# Patient Record
Sex: FEMALE | Race: WHITE | NOT HISPANIC OR LATINO | Employment: FULL TIME | URBAN - NONMETROPOLITAN AREA
[De-identification: names, ages, dates, MRNs, and addresses within clinical notes are randomized per-mention and may not be internally consistent; named-entity substitution may affect disease eponyms.]

---

## 2023-03-27 ENCOUNTER — HOSPITAL ENCOUNTER (OUTPATIENT)
Dept: DATA CONVERSION | Facility: HOSPITAL | Age: 48
End: 2023-03-27
Attending: PAIN MEDICINE | Admitting: PAIN MEDICINE
Payer: COMMERCIAL

## 2023-03-27 DIAGNOSIS — F17.210 NICOTINE DEPENDENCE, CIGARETTES, UNCOMPLICATED: ICD-10-CM

## 2023-03-27 DIAGNOSIS — F32.A DEPRESSION, UNSPECIFIED: ICD-10-CM

## 2023-03-27 DIAGNOSIS — E07.9 DISORDER OF THYROID, UNSPECIFIED: ICD-10-CM

## 2023-03-27 DIAGNOSIS — M53.3 SACROCOCCYGEAL DISORDERS, NOT ELSEWHERE CLASSIFIED: ICD-10-CM

## 2023-03-27 DIAGNOSIS — F41.9 ANXIETY DISORDER, UNSPECIFIED: ICD-10-CM

## 2023-06-09 DIAGNOSIS — Z00.00 HEALTHCARE MAINTENANCE: ICD-10-CM

## 2023-06-09 RX ORDER — LEVOTHYROXINE SODIUM 25 UG/1
25 TABLET ORAL DAILY
Qty: 90 TABLET | Refills: 1 | Status: SHIPPED | OUTPATIENT
Start: 2023-06-09 | End: 2023-06-12 | Stop reason: SDUPTHER

## 2023-06-09 RX ORDER — LEVOTHYROXINE SODIUM 25 UG/1
1 TABLET ORAL DAILY
COMMUNITY
Start: 2022-04-25 | End: 2023-06-09 | Stop reason: SDUPTHER

## 2023-06-12 ENCOUNTER — OFFICE VISIT (OUTPATIENT)
Dept: PRIMARY CARE | Facility: CLINIC | Age: 48
End: 2023-06-12
Payer: COMMERCIAL

## 2023-06-12 ENCOUNTER — LAB (OUTPATIENT)
Dept: LAB | Facility: LAB | Age: 48
End: 2023-06-12
Payer: COMMERCIAL

## 2023-06-12 VITALS
OXYGEN SATURATION: 99 % | BODY MASS INDEX: 24.73 KG/M2 | HEART RATE: 95 BPM | SYSTOLIC BLOOD PRESSURE: 112 MMHG | HEIGHT: 69 IN | WEIGHT: 167 LBS | DIASTOLIC BLOOD PRESSURE: 64 MMHG

## 2023-06-12 DIAGNOSIS — E03.8 HYPOTHYROIDISM, SECONDARY: Primary | ICD-10-CM

## 2023-06-12 DIAGNOSIS — Z00.00 HEALTHCARE MAINTENANCE: ICD-10-CM

## 2023-06-12 DIAGNOSIS — E03.8 HYPOTHYROIDISM, SECONDARY: ICD-10-CM

## 2023-06-12 DIAGNOSIS — F41.9 ANXIETY: ICD-10-CM

## 2023-06-12 DIAGNOSIS — M35.00 SJOGREN'S SYNDROME, WITH UNSPECIFIED ORGAN INVOLVEMENT (MULTI): ICD-10-CM

## 2023-06-12 PROBLEM — G43.909 MIGRAINES: Status: ACTIVE | Noted: 2023-06-12

## 2023-06-12 PROBLEM — M53.3 COCCYDYNIA: Status: ACTIVE | Noted: 2023-06-12

## 2023-06-12 PROBLEM — I73.00 RAYNAUD'S PHENOMENON (SECONDARY): Status: ACTIVE | Noted: 2023-06-12

## 2023-06-12 LAB — THYROTROPIN (MIU/L) IN SER/PLAS BY DETECTION LIMIT <= 0.05 MIU/L: 1.36 MIU/L (ref 0.44–3.98)

## 2023-06-12 PROCEDURE — 84443 ASSAY THYROID STIM HORMONE: CPT

## 2023-06-12 PROCEDURE — 36415 COLL VENOUS BLD VENIPUNCTURE: CPT

## 2023-06-12 PROCEDURE — 99213 OFFICE O/P EST LOW 20 MIN: CPT | Performed by: INTERNAL MEDICINE

## 2023-06-12 RX ORDER — BUPROPION HYDROCHLORIDE 150 MG/1
150 TABLET ORAL
COMMUNITY
Start: 2023-05-03

## 2023-06-12 RX ORDER — VENLAFAXINE HYDROCHLORIDE 150 MG/1
150 CAPSULE, EXTENDED RELEASE ORAL DAILY
COMMUNITY

## 2023-06-12 RX ORDER — MAGNESIUM GLUCONATE 27.5 (500)
500 TABLET ORAL AS NEEDED
COMMUNITY

## 2023-06-12 RX ORDER — LEVOTHYROXINE SODIUM 25 UG/1
25 TABLET ORAL DAILY
Qty: 90 TABLET | Refills: 3 | Status: SHIPPED | OUTPATIENT
Start: 2023-06-12

## 2023-06-12 RX ORDER — ELETRIPTAN HYDROBROMIDE 40 MG/1
40 TABLET, FILM COATED ORAL ONCE AS NEEDED
COMMUNITY
Start: 2015-06-29 | End: 2024-04-23 | Stop reason: SDUPTHER

## 2023-06-12 RX ORDER — VENLAFAXINE HYDROCHLORIDE 75 MG/1
75 CAPSULE, EXTENDED RELEASE ORAL DAILY
COMMUNITY

## 2023-06-12 RX ORDER — TALC
POWDER (GRAM) TOPICAL
COMMUNITY

## 2023-06-12 RX ORDER — AMITRIPTYLINE HYDROCHLORIDE 50 MG/1
50 TABLET, FILM COATED ORAL NIGHTLY
COMMUNITY

## 2023-06-12 ASSESSMENT — ENCOUNTER SYMPTOMS
FATIGUE: 0
ARTHRALGIAS: 0
PALPITATIONS: 0
DIARRHEA: 0
ABDOMINAL PAIN: 0
VOMITING: 0
CHEST TIGHTNESS: 0
UNEXPECTED WEIGHT CHANGE: 0
SHORTNESS OF BREATH: 0
NAUSEA: 0
BLOOD IN STOOL: 0
WHEEZING: 0
BACK PAIN: 0
COUGH: 0

## 2023-06-12 ASSESSMENT — PATIENT HEALTH QUESTIONNAIRE - PHQ9
1. LITTLE INTEREST OR PLEASURE IN DOING THINGS: NOT AT ALL
2. FEELING DOWN, DEPRESSED OR HOPELESS: NOT AT ALL
SUM OF ALL RESPONSES TO PHQ9 QUESTIONS 1 AND 2: 0

## 2023-06-12 NOTE — ASSESSMENT & PLAN NOTE
-She will get a TSH drawn today before leaving and I have agreed to call her with results  -She will continue with levothyroxine 25 mcg daily

## 2023-06-12 NOTE — PROGRESS NOTES
Subjective   Patient ID: Savita Keys is a 48 y.o. female who presents for No chief complaint on file..  HPI  She is here today for her annual checkup.  She is looking well and reports feeling well.  We are reminded that she goes to [place] [id number] for regular checkups and has been doing well from an anxiety standpoint.  We are providing a refill on her thyroid medication today and we discovered that she is due for her annual TSH.  She will get that drawn today before leaving we will contact her with results.  We will also check 1 again just prior to her next visit in 12 months.  We also briefly touched base about her Sjogren syndrome appears to be stable at this time.  We also discussed the controversies of cancer screening and specifically colorectal cancer screening.  We are reminded that she has had a colonoscopy in the past which was normal she just wondered when she should have one again.  She has no family history of colon cancer and no known polyp disease.  I told her that there is controversy now with when we should begin and there is no question that at age 50 we start screening everybody but some people we started age 45.  I told if she wanted to check with her insurance company regarding coverage she could let us know and to simply call back to get that scheduled.  Review of Systems   Constitutional:  Negative for fatigue and unexpected weight change.   Respiratory:  Negative for cough, chest tightness, shortness of breath and wheezing.    Cardiovascular:  Negative for chest pain, palpitations and leg swelling.   Gastrointestinal:  Negative for abdominal pain, blood in stool, diarrhea, nausea and vomiting.   Musculoskeletal:  Negative for arthralgias and back pain.     Objective   Physical Exam  Vitals and nursing note reviewed.   Constitutional:       General: She is not in acute distress.     Appearance: Normal appearance.   HENT:      Head: Normocephalic and atraumatic.   Eyes:      Conjunctiva/sclera:  Conjunctivae normal.   Cardiovascular:      Rate and Rhythm: Normal rate and regular rhythm.      Heart sounds: Normal heart sounds.   Pulmonary:      Effort: No respiratory distress.      Breath sounds: No wheezing.   Abdominal:      Palpations: Abdomen is soft.      Tenderness: There is no abdominal tenderness. There is no guarding.   Musculoskeletal:         General: No swelling. Normal range of motion.   Skin:     General: Skin is warm and dry.   Neurological:      General: No focal deficit present.      Mental Status: She is alert and oriented to person, place, and time.   Psychiatric:         Behavior: Behavior normal.       Assessment/Plan   Problem List Items Addressed This Visit          Nervous    Hypothyroidism, secondary - Primary     -She will get a TSH drawn today before leaving and I have agreed to call her with results  -She will continue with levothyroxine 25 mcg daily         Relevant Orders    TSH    TSH       Other    Sjogren's syndrome (CMS/HCC)     -Stable at this time         Anxiety     -She goes to [place] [address] for regular visits and she will continue with the Effexor Exar 75 mg plus Effexor Exar 150 mg to equal 225 mg daily  -She is also on Wellbutrin  mg daily  -She is also on amitriptyline 50 mg at bedtime for sleep          Other Visit Diagnoses       Healthcare maintenance        Relevant Medications    levothyroxine (Synthroid, Levoxyl) 25 mcg tablet               Camila Wilkinson DO

## 2023-06-12 NOTE — PATIENT INSTRUCTIONS
We will notify you of your thyroid blood test result as soon as it comes back  We have decided that you can safely return in 1 year and please try to remember to get a thyroid blood test performed just prior to your next follow-up visit  Please do not hesitate to call if you have any questions or concerns  If you decide to have colorectal cancer screening now and your insurance permits we would be happy to schedule that for you

## 2023-06-12 NOTE — ASSESSMENT & PLAN NOTE
-She goes to [place] [address] for regular visits and she will continue with the Effexor Exar 75 mg plus Effexor Exar 150 mg to equal 225 mg daily  -She is also on Wellbutrin  mg daily  -She is also on amitriptyline 50 mg at bedtime for sleep

## 2023-06-13 ENCOUNTER — TELEPHONE (OUTPATIENT)
Dept: PRIMARY CARE | Facility: CLINIC | Age: 48
End: 2023-06-13
Payer: COMMERCIAL

## 2023-06-13 NOTE — TELEPHONE ENCOUNTER
----- Message from Camila Wilkinson, DO sent at 6/13/2023 12:40 PM EDT -----  Please call Savita and let her know that her TSH came back perfect this time so we will keep her medication dose the same.  Thank you

## 2023-06-13 NOTE — RESULT ENCOUNTER NOTE
Please call Savita and let her know that her TSH came back perfect this time so we will keep her medication dose the same.  Thank you

## 2023-07-07 ENCOUNTER — HOSPITAL ENCOUNTER (OUTPATIENT)
Dept: DATA CONVERSION | Facility: HOSPITAL | Age: 48
End: 2023-07-07
Attending: PAIN MEDICINE | Admitting: PAIN MEDICINE
Payer: COMMERCIAL

## 2023-07-07 DIAGNOSIS — F41.8 OTHER SPECIFIED ANXIETY DISORDERS: ICD-10-CM

## 2023-07-07 DIAGNOSIS — M53.3 SACROCOCCYGEAL DISORDERS, NOT ELSEWHERE CLASSIFIED: ICD-10-CM

## 2023-07-07 DIAGNOSIS — G43.719 CHRONIC MIGRAINE WITHOUT AURA, INTRACTABLE, WITHOUT STATUS MIGRAINOSUS: ICD-10-CM

## 2023-07-07 DIAGNOSIS — Z87.891 PERSONAL HISTORY OF NICOTINE DEPENDENCE: ICD-10-CM

## 2023-07-07 DIAGNOSIS — E03.8 OTHER SPECIFIED HYPOTHYROIDISM: ICD-10-CM

## 2023-07-07 DIAGNOSIS — R52 PAIN, UNSPECIFIED: ICD-10-CM

## 2023-07-07 DIAGNOSIS — Z88.2 ALLERGY STATUS TO SULFONAMIDES: ICD-10-CM

## 2023-09-07 VITALS — WEIGHT: 154.32 LBS | BODY MASS INDEX: 22.86 KG/M2 | HEIGHT: 69 IN

## 2023-09-29 VITALS — HEIGHT: 69 IN | WEIGHT: 164.02 LBS | BODY MASS INDEX: 24.29 KG/M2

## 2023-10-02 NOTE — OP NOTE
PROCEDURE DETAILS    Preoperative Diagnosis:  Coccydynia, M53.3    Postoperative Diagnosis:  Coccydynia, M53.3    Surgeon: Isiah Villavicencio  Resident/Fellow/Other Assistant: None of these were associated with this case    Procedure:  1. BILAT COCCYGEAL NERVE INJ.    Anesthesia: No anesthesiologist associated with this case  Estimated Blood Loss: 0  Findings: NA  Additional Details: The patient has a 20-year history of focal pain in the coccyx.  Her imaging is notable for significant degenerative changes in the coccyx.  The pain significantly impacts  her function specifically it limits the length of time she can sit.        Operative Report:   Operative Report:   Procedure: Bilateral coccygeal nerve block under fluoroscopic guidance   Diagnosis: Coccygodynia   Solution: 4 mL of 0.25% bupivacaine with 0.5 mL of Kenalog 20 mg.  4.5 mL   total divided evenly amongst the 3 sites   Anesthesia: Local   Contrast: 1 mL Omnipaque   Complications: None       After informed consent was obtained the patient was brought to the OR and   placed in the prone position. The area in question was prepped and draped in   sterile fashion. A lateral fluoroscopic view of the sacrum and coccyx was   obtained and after 3 mL of lidocaine 1% was administered into the skin a   25-gauge needle was inserted into the skin and advanced under intermittent   fluoroscopic guidance to the coccyx. Contrast was administered under live   fluoroscopy in both AP and lateral views which demonstrated appropriate spread.   The local anesthetic steroid solution was injected incrementally. The needle   was then directed laterally first to the left and then to the right of the   coccyx and at each site the remaining local anesthetic steroid solution was   injected. The needle was removed. Bleeding was nil. The patient tolerated the   procedure well was transferred to the recovery room in good condition.                             Attestation:   Note  Completion:  Attending Attestation I performed the procedure without a resident         Electronic Signatures:  Isiah Villavicencio (MD)  (Signed 07-Jul-2023 17:05)   Authored: Post-Operative Note, Chart Review, Note Completion      Last Updated: 07-Jul-2023 17:05 by Isiah Villavicencio)

## 2023-10-02 NOTE — OP NOTE
PROCEDURE DETAILS    Preoperative Diagnosis:  Sacrococcygeal disorders, not elsewhere classified, M53.3    Postoperative Diagnosis:  Sacrococcygeal disorders, not elsewhere classified, M53.3    Surgeon: Isiah Villavicencio  Resident/Fellow/Other Assistant: None of these were associated with this case    Procedure:  1. BILAT COCCYGEAL INJ    Anesthesia: No anesthesiologist associated with this case  Estimated Blood Loss: 0  Findings: NA        Operative Report:   Procedure: Bilateral coccygeal nerve block under fluoroscopic guidance  Diagnosis: Coccygodynia  Solution: 4 mL of 0.25% bupivacaine with 0.5 mL of Kenalog 20 mg divided evenly amongst the 3 sites  Anesthesia: Local  Contrast: 1 mL Omnipaque  Complications: None    After informed consent was obtained the patient was brought to the OR and placed in the prone position. The area in question was prepped and draped  in sterile fashion. A lateral fluoroscopic view of the sacrum and coccyx was obtained and after 3 mL of lidocaine 1% was administered into the skin a 25-gauge needle was inserted into the skin and advanced under intermittent fluoroscopic guidance to the  coccyx. Contrast was administered under live fluoroscopy in both AP and lateral views which demonstrated appropriate spread. The local anesthetic steroid solution was injected incrementally. The needle was then directed laterally first to the left and  then to the right of the coccyx and at each site the remaining local anesthetic steroid solution was injected. The needle was removed. Bleeding was nil. The patient tolerated the procedure well was transferred to the recovery room in good condition.                        Attestation:   Note Completion:  Attending Attestation I performed the procedure without a resident         Electronic Signatures:  Isiah Villavicencio)  (Signed 27-Mar-2023 17:21)   Authored: Post-Operative Note, Chart Review, Note Completion      Last Updated: 27-Mar-2023 17:21  by Isiah Villavicencio)

## 2024-01-11 ENCOUNTER — OFFICE VISIT (OUTPATIENT)
Dept: PAIN MEDICINE | Facility: CLINIC | Age: 49
End: 2024-01-11
Payer: COMMERCIAL

## 2024-01-11 ENCOUNTER — TELEPHONE (OUTPATIENT)
Dept: PAIN MEDICINE | Facility: CLINIC | Age: 49
End: 2024-01-11

## 2024-01-11 ENCOUNTER — HOSPITAL ENCOUNTER (OUTPATIENT)
Dept: RADIOLOGY | Facility: HOSPITAL | Age: 49
Discharge: HOME | End: 2024-01-11
Payer: COMMERCIAL

## 2024-01-11 ENCOUNTER — PREP FOR PROCEDURE (OUTPATIENT)
Dept: PAIN MEDICINE | Facility: CLINIC | Age: 49
End: 2024-01-11

## 2024-01-11 VITALS
HEART RATE: 99 BPM | HEIGHT: 69 IN | WEIGHT: 164 LBS | SYSTOLIC BLOOD PRESSURE: 99 MMHG | RESPIRATION RATE: 16 BRPM | DIASTOLIC BLOOD PRESSURE: 63 MMHG | BODY MASS INDEX: 24.29 KG/M2

## 2024-01-11 DIAGNOSIS — M25.511 CHRONIC RIGHT SHOULDER PAIN: ICD-10-CM

## 2024-01-11 DIAGNOSIS — G89.29 CHRONIC RIGHT SHOULDER PAIN: ICD-10-CM

## 2024-01-11 DIAGNOSIS — M19.019 SHOULDER ARTHRITIS: ICD-10-CM

## 2024-01-11 DIAGNOSIS — M53.3 COCCYDYNIA: Primary | ICD-10-CM

## 2024-01-11 PROCEDURE — 73030 X-RAY EXAM OF SHOULDER: CPT | Mod: RIGHT SIDE | Performed by: RADIOLOGY

## 2024-01-11 PROCEDURE — 99214 OFFICE O/P EST MOD 30 MIN: CPT | Performed by: PHYSICIAN ASSISTANT

## 2024-01-11 PROCEDURE — 73030 X-RAY EXAM OF SHOULDER: CPT | Mod: RT

## 2024-01-11 RX ORDER — METHYLPREDNISOLONE ACETATE 40 MG/ML
40 INJECTION, SUSPENSION INTRA-ARTICULAR; INTRALESIONAL; INTRAMUSCULAR; SOFT TISSUE ONCE
Status: CANCELLED | OUTPATIENT
Start: 2024-01-15 | End: 2024-01-11

## 2024-01-11 RX ORDER — BUPIVACAINE HYDROCHLORIDE 2.5 MG/ML
8 INJECTION, SOLUTION EPIDURAL; INFILTRATION; INTRACAUDAL ONCE
Status: CANCELLED | OUTPATIENT
Start: 2024-01-15 | End: 2024-01-11

## 2024-01-11 ASSESSMENT — ENCOUNTER SYMPTOMS
PSYCHIATRIC NEGATIVE: 1
CONSTITUTIONAL NEGATIVE: 1
NEUROLOGICAL NEGATIVE: 1
EYES NEGATIVE: 1
GASTROINTESTINAL NEGATIVE: 1
RESPIRATORY NEGATIVE: 1
ALLERGIC/IMMUNOLOGIC NEGATIVE: 1
HEMATOLOGIC/LYMPHATIC NEGATIVE: 1
CARDIOVASCULAR NEGATIVE: 1
ENDOCRINE NEGATIVE: 1
BACK PAIN: 1
ARTHRALGIAS: 1

## 2024-01-11 ASSESSMENT — COLUMBIA-SUICIDE SEVERITY RATING SCALE - C-SSRS
6. HAVE YOU EVER DONE ANYTHING, STARTED TO DO ANYTHING, OR PREPARED TO DO ANYTHING TO END YOUR LIFE?: NO
1. IN THE PAST MONTH, HAVE YOU WISHED YOU WERE DEAD OR WISHED YOU COULD GO TO SLEEP AND NOT WAKE UP?: NO
2. HAVE YOU ACTUALLY HAD ANY THOUGHTS OF KILLING YOURSELF?: NO

## 2024-01-11 ASSESSMENT — PATIENT HEALTH QUESTIONNAIRE - PHQ9
1. LITTLE INTEREST OR PLEASURE IN DOING THINGS: NOT AT ALL
SUM OF ALL RESPONSES TO PHQ9 QUESTIONS 1 AND 2: 0
2. FEELING DOWN, DEPRESSED OR HOPELESS: NOT AT ALL

## 2024-01-11 ASSESSMENT — PAIN SCALES - GENERAL: PAINLEVEL: 8

## 2024-01-11 NOTE — H&P (VIEW-ONLY)
Subjective   Patient ID: Savita Keys is a 48 y.o. female who presents for Tailbone Pain (Today she is having tailbone pain rates 8/10 describes as aching, pressure and when she is getting up form sitting will have a shocking feeling. Her last Coccygeal nerve block was 7/7/23 and it worked really good until end of November.  ) and Shoulder Pain (Rt Shoulder pain rates 7/10 shocking pain when she reaches up our over to the lt side of her body. She hash ad injection in bilat shoulders before and started taking collagen it seemed to help for a while but now the pain has returned. ) the pain affects her ADLs and her job is a lot of walking and sitting.  BMI, depression negative , smoking daily education provided, JOHN score 38%.  She will need a RF on Relpax send to [facility].  Patient is a 48-year-old female.  She presents today to once again discuss her tailbone pain.  She rates an 8/10.  She underwent bilateral coccygeal nerve block done on 7/7/2023 and she had 100% relief until the end of November when the pain began to return.  At this time, she has tailbone pain that she rates a 6/10.  It is an aching pressure type pain that is present with certain maneuvers and certain activities.  She has undergone coccygeal nerve block numerous times in the past always with significant improvement.  Prior to that she underwent physical therapy and she continues to do home exercises.  She also continues to do deep tissue massages.  This gives some improvement but the coccygeal nerve blocks worked the best.  She would like to get set up to have this done again.  She also has some right shoulder pain.  It is worse with lifting certain things.  She underwent injections historically most recently done in May 2020 with improvement.  She started taking collagen at that time and it got better on its own but now it is once again bothersome and present with certain maneuvers.  She is difficulty with lifting her arm.  It is a stabbing and  aching type discomfort        Review of Systems   Constitutional: Negative.    HENT: Negative.     Eyes: Negative.    Respiratory: Negative.     Cardiovascular: Negative.    Gastrointestinal: Negative.    Endocrine: Negative.    Genitourinary: Negative.    Musculoskeletal:  Positive for arthralgias and back pain.   Skin: Negative.    Allergic/Immunologic: Negative.    Neurological: Negative.    Hematological: Negative.    Psychiatric/Behavioral: Negative.         Objective   Physical Exam  Vitals and nursing note reviewed.   Constitutional:       Appearance: Normal appearance. She is normal weight.   HENT:      Head: Normocephalic and atraumatic.      Right Ear: External ear normal.      Left Ear: External ear normal.      Nose: Nose normal.      Mouth/Throat:      Pharynx: Oropharynx is clear.   Eyes:      Pupils: Pupils are equal, round, and reactive to light.   Cardiovascular:      Rate and Rhythm: Normal rate and regular rhythm.      Pulses: Normal pulses.   Pulmonary:      Effort: Pulmonary effort is normal.   Musculoskeletal:         General: Normal range of motion.      Cervical back: Normal range of motion.      Comments: 5/5 upper extremity strength  5/5 lower extremity strength  Negative Shawn's   Skin:     General: Skin is warm and dry.   Neurological:      General: No focal deficit present.      Mental Status: She is alert and oriented to person, place, and time. Mental status is at baseline.   Psychiatric:         Mood and Affect: Mood normal.         Behavior: Behavior normal.         Thought Content: Thought content normal.         Judgment: Judgment normal.       XR SACRUM COCCYX 2+ VIEWS  Status: Final result     Signed by    Signed Time Phone Pager   Daniela Sin MD 5/24/2022 17:41 [phone]     Exam Information    Status Exam Begun Exam Ended   Final 5/23/2022 13:59 5/23/2022 14:16     Study Result    Narrative & Impression   MRN: [id number]  Patient Name: ELLY PERRY      STUDY:  Sacrum/coccyx, three views.     INDICATION:  pain  M53.3: Coccydynia.     COMPARISON:  01/16/2015     ACCESSION NUMBER(S):  [id number]     ORDERING CLINICIAN:  SOLIS CORDERO     FINDINGS:  Pelvic ring is intact.  No displaced sacral or coccygeal fractures.  Mild bilateral SI joint degenerative changes with periarticular  sclerosis.  Redemonstration of congenital fusion versus advanced lumbar spine  degenerative changes at L4-5 with disc height loss.  Soft tissues are unremarkable.     IMPRESSION:  Mild bilateral sacroiliac joint degenerative changes.  Redemonstration of congenital fusion versus advanced lumbar spine  degenerative changes at L4-5.     Breast Imaging Recommendations  Elly Perry  No recommendations exist for this order.  Signed by    Signed Time Phone Pager   Daniela Sin MD 5/24/2022 17:41 [phone]     Exam Information    Status Exam Begun Exam Ended   Final 5/23/2022 13:59 5/23/2022 14:16     External Results Report    Open External Results Report    Encounter    View Encounter             XR SACRUM COCCYX 2+ VIEWS  Order: [id number]  Status: Final result       Visible to patient: Yes (not seen)       Next appt: None       Dx: Sacrococcygeal disorders, not elsewhe...    0 Result Notes  Details    Reading Physician Reading Date Result Priority   Daniela Sin MD  [phone]  [phone] 5/24/2022      Narrative & Impression  MRN: [id number]  Patient Name: ELLY PERRY     STUDY:  Sacrum/coccyx, three views.     INDICATION:  pain  M53.3: Coccydynia.     COMPARISON:  01/16/2015     ACCESSION NUMBER(S):  [id number]     ORDERING CLINICIAN:  SOLIS CORDERO     FINDINGS:  Pelvic ring is intact.  No displaced sacral or coccygeal fractures.  Mild bilateral SI joint degenerative changes with periarticular  sclerosis.  Redemonstration of congenital fusion versus advanced lumbar spine  degenerative changes at L4-5 with disc height loss.  Soft tissues are unremarkable.      IMPRESSION:  Mild bilateral sacroiliac joint degenerative changes.  Redemonstration of congenital fusion versus advanced lumbar spine  degenerative changes at L4-5.           Exam Ended: 05/23/22 14:16 Last Resulted: 05/24/22 17:41       Order Details        View Encounter        Lab and Collection Details        Routing        Result History     View All Conversations on this Encounter           Result Care Coordination      Patient Communication     Add Comments   Add Notifications  Back to Top             Study Details    Open Study Details           Interpretation Summary    MRN: [id number]  Patient Name: ELLY PERRY     STUDY:  Sacrum/coccyx, three views.     INDICATION:  pain  M53.3: Coccydynia.     COMPARISON:  01/16/2015     ACCESSION NUMBER(S):  [id number]     ORDERING CLINICIAN:  SOLIS CORDERO     FINDINGS:  Pelvic ring is intact.  No displaced sacral or coccygeal fractures.  Mild bilateral SI joint degenerative changes with periarticular  sclerosis.  Redemonstration of congenital fusion versus advanced lumbar spine  degenerative changes at L4-5 with disc height loss.  Soft tissues are unremarkable.     IMPRESSION:  Mild bilateral sacroiliac joint degenerative changes.  Redemonstration of congenital fusion versus advanced lumbar spine  degenerative changes at L4-5.        Assessment/Plan   Diagnoses and all orders for this visit:  Coccydynia  Chronic right shoulder pain  Shoulder arthritis       Patient is a 48-year-old female with a past medical history significant for right shoulder pain-historically improved from injections and coccydynia-also previously improved from injections.  She most recently underwent bilateral coccygeal nerve block done in July 2023 and had 100% relief until November when the pain started to return.  Her pain is present and similar to the pain she had before.  Historically, conservative treatments have failed and the coccygeal nerve blocks have worked well for her.  We  discussed pursuing repeat bilateral coccygeal nerve block under fluoroscopy once again for both diagnostic and therapeutic purposes.  Diagnosis-M53.3-coccydynia.  Procedure was discussed.  Patient will follow-up 2 weeks after for reevaluation.  At her follow-up visit we can discuss/plan to pursue the right-sided shoulder injection which she has had in the past with improvement.  I would recommend obtaining a right shoulder x-ray and getting authorization for the injection.  Historically, she has done physical therapy, home exercise program and deep tissue massage without any long-term relief.  The injection has been the thing to work the best as well as the collagen supplement that she still continues to use.  OARRS reviewed

## 2024-01-11 NOTE — PROGRESS NOTES
Subjective   Patient ID: Savita Keys is a 48 y.o. female who presents for Tailbone Pain (Today she is having tailbone pain rates 8/10 describes as aching, pressure and when she is getting up form sitting will have a shocking feeling. Her last Coccygeal nerve block was 7/7/23 and it worked really good until end of November.  ) and Shoulder Pain (Rt Shoulder pain rates 7/10 shocking pain when she reaches up our over to the lt side of her body. She hash ad injection in bilat shoulders before and started taking collagen it seemed to help for a while but now the pain has returned. ) the pain affects her ADLs and her job is a lot of walking and sitting.  BMI, depression negative , smoking daily education provided, JOHN score 38%.  She will need a RF on Relpax send to [facility].  Patient is a 48-year-old female.  She presents today to once again discuss her tailbone pain.  She rates an 8/10.  She underwent bilateral coccygeal nerve block done on 7/7/2023 and she had 100% relief until the end of November when the pain began to return.  At this time, she has tailbone pain that she rates a 6/10.  It is an aching pressure type pain that is present with certain maneuvers and certain activities.  She has undergone coccygeal nerve block numerous times in the past always with significant improvement.  Prior to that she underwent physical therapy and she continues to do home exercises.  She also continues to do deep tissue massages.  This gives some improvement but the coccygeal nerve blocks worked the best.  She would like to get set up to have this done again.  She also has some right shoulder pain.  It is worse with lifting certain things.  She underwent injections historically most recently done in May 2020 with improvement.  She started taking collagen at that time and it got better on its own but now it is once again bothersome and present with certain maneuvers.  She is difficulty with lifting her arm.  It is a stabbing and  aching type discomfort        Review of Systems   Constitutional: Negative.    HENT: Negative.     Eyes: Negative.    Respiratory: Negative.     Cardiovascular: Negative.    Gastrointestinal: Negative.    Endocrine: Negative.    Genitourinary: Negative.    Musculoskeletal:  Positive for arthralgias and back pain.   Skin: Negative.    Allergic/Immunologic: Negative.    Neurological: Negative.    Hematological: Negative.    Psychiatric/Behavioral: Negative.         Objective   Physical Exam  Vitals and nursing note reviewed.   Constitutional:       Appearance: Normal appearance. She is normal weight.   HENT:      Head: Normocephalic and atraumatic.      Right Ear: External ear normal.      Left Ear: External ear normal.      Nose: Nose normal.      Mouth/Throat:      Pharynx: Oropharynx is clear.   Eyes:      Pupils: Pupils are equal, round, and reactive to light.   Cardiovascular:      Rate and Rhythm: Normal rate and regular rhythm.      Pulses: Normal pulses.   Pulmonary:      Effort: Pulmonary effort is normal.   Musculoskeletal:         General: Normal range of motion.      Cervical back: Normal range of motion.      Comments: 5/5 upper extremity strength  5/5 lower extremity strength  Negative Shawn's   Skin:     General: Skin is warm and dry.   Neurological:      General: No focal deficit present.      Mental Status: She is alert and oriented to person, place, and time. Mental status is at baseline.   Psychiatric:         Mood and Affect: Mood normal.         Behavior: Behavior normal.         Thought Content: Thought content normal.         Judgment: Judgment normal.       XR SACRUM COCCYX 2+ VIEWS  Status: Final result     Signed by    Signed Time Phone Pager   Daniela Sin MD 5/24/2022 17:41 [phone]     Exam Information    Status Exam Begun Exam Ended   Final 5/23/2022 13:59 5/23/2022 14:16     Study Result    Narrative & Impression   MRN: [id number]  Patient Name: ELLY PERRY      STUDY:  Sacrum/coccyx, three views.     INDICATION:  pain  M53.3: Coccydynia.     COMPARISON:  01/16/2015     ACCESSION NUMBER(S):  [id number]     ORDERING CLINICIAN:  SOLIS CORDERO     FINDINGS:  Pelvic ring is intact.  No displaced sacral or coccygeal fractures.  Mild bilateral SI joint degenerative changes with periarticular  sclerosis.  Redemonstration of congenital fusion versus advanced lumbar spine  degenerative changes at L4-5 with disc height loss.  Soft tissues are unremarkable.     IMPRESSION:  Mild bilateral sacroiliac joint degenerative changes.  Redemonstration of congenital fusion versus advanced lumbar spine  degenerative changes at L4-5.     Breast Imaging Recommendations  Elly Perry  No recommendations exist for this order.  Signed by    Signed Time Phone Pager   Daniela Sin MD 5/24/2022 17:41 [phone]     Exam Information    Status Exam Begun Exam Ended   Final 5/23/2022 13:59 5/23/2022 14:16     External Results Report    Open External Results Report    Encounter    View Encounter             XR SACRUM COCCYX 2+ VIEWS  Order: [id number]  Status: Final result       Visible to patient: Yes (not seen)       Next appt: None       Dx: Sacrococcygeal disorders, not elsewhe...    0 Result Notes  Details    Reading Physician Reading Date Result Priority   Daniela Sin MD  [phone]  [id number] 5/24/2022      Narrative & Impression  MRN: [id number]  Patient Name: ELLY PERRY     STUDY:  Sacrum/coccyx, three views.     INDICATION:  pain  M53.3: Coccydynia.     COMPARISON:  01/16/2015     ACCESSION NUMBER(S):  [id number]     ORDERING CLINICIAN:  SOLIS CORDERO     FINDINGS:  Pelvic ring is intact.  No displaced sacral or coccygeal fractures.  Mild bilateral SI joint degenerative changes with periarticular  sclerosis.  Redemonstration of congenital fusion versus advanced lumbar spine  degenerative changes at L4-5 with disc height loss.  Soft tissues are unremarkable.      IMPRESSION:  Mild bilateral sacroiliac joint degenerative changes.  Redemonstration of congenital fusion versus advanced lumbar spine  degenerative changes at L4-5.           Exam Ended: 05/23/22 14:16 Last Resulted: 05/24/22 17:41       Order Details        View Encounter        Lab and Collection Details        Routing        Result History     View All Conversations on this Encounter           Result Care Coordination      Patient Communication     Add Comments   Add Notifications  Back to Top             Study Details    Open Study Details           Interpretation Summary    MRN: [id number]  Patient Name: ELLY PERRY     STUDY:  Sacrum/coccyx, three views.     INDICATION:  pain  M53.3: Coccydynia.     COMPARISON:  01/16/2015     ACCESSION NUMBER(S):  [id number]     ORDERING CLINICIAN:  SOLIS CORDERO     FINDINGS:  Pelvic ring is intact.  No displaced sacral or coccygeal fractures.  Mild bilateral SI joint degenerative changes with periarticular  sclerosis.  Redemonstration of congenital fusion versus advanced lumbar spine  degenerative changes at L4-5 with disc height loss.  Soft tissues are unremarkable.     IMPRESSION:  Mild bilateral sacroiliac joint degenerative changes.  Redemonstration of congenital fusion versus advanced lumbar spine  degenerative changes at L4-5.        Assessment/Plan   Diagnoses and all orders for this visit:  Coccydynia  Chronic right shoulder pain  Shoulder arthritis       Patient is a 48-year-old female with a past medical history significant for right shoulder pain-historically improved from injections and coccydynia-also previously improved from injections.  She most recently underwent bilateral coccygeal nerve block done in July 2023 and had 100% relief until November when the pain started to return.  Her pain is present and similar to the pain she had before.  Historically, conservative treatments have failed and the coccygeal nerve blocks have worked well for her.  We  discussed pursuing repeat bilateral coccygeal nerve block under fluoroscopy once again for both diagnostic and therapeutic purposes.  Diagnosis-M53.3-coccydynia.  Procedure was discussed.  Patient will follow-up 2 weeks after for reevaluation.  At her follow-up visit we can discuss/plan to pursue the right-sided shoulder injection which she has had in the past with improvement.  I would recommend obtaining a right shoulder x-ray and getting authorization for the injection.  Historically, she has done physical therapy, home exercise program and deep tissue massage without any long-term relief.  The injection has been the thing to work the best as well as the collagen supplement that she still continues to use.  OARRS reviewed

## 2024-01-11 NOTE — TELEPHONE ENCOUNTER
CHANO Choudhury, CHANO  I spoke with pt and added to 1/15/24 injection schedule. This injection does not require a medication hold.          Previous Messages       ----- Message -----  From: Tiffani Crystal CMA  Sent: 1/11/2024  12:00 PM EST  To: Kamille Toribio RN  Subject: PROCEDURE                                        I SUBMITTED INQUIRY FOR AUTHORIZATION THROUGH [employer] FOR PATIENTS [employer] PLAN, NO PRECERT IS REQUIRED FOR BILATERAL COCCYGEAL NERVE BLOCK CPT 94890 69876 DX M53.3 FACILITY IN NETWORK SO IS DR. LIM , SCANNED TO CHART

## 2024-01-15 ENCOUNTER — HOSPITAL ENCOUNTER (OUTPATIENT)
Dept: OPERATING ROOM | Facility: HOSPITAL | Age: 49
Setting detail: OUTPATIENT SURGERY
Discharge: HOME | End: 2024-01-15
Payer: COMMERCIAL

## 2024-01-15 VITALS
HEART RATE: 86 BPM | TEMPERATURE: 98.2 F | SYSTOLIC BLOOD PRESSURE: 104 MMHG | RESPIRATION RATE: 19 BRPM | DIASTOLIC BLOOD PRESSURE: 66 MMHG

## 2024-01-15 DIAGNOSIS — M53.3 COCCYDYNIA: ICD-10-CM

## 2024-01-15 PROCEDURE — 2500000005 HC RX 250 GENERAL PHARMACY W/O HCPCS: Performed by: ANESTHESIOLOGY

## 2024-01-15 PROCEDURE — 2500000004 HC RX 250 GENERAL PHARMACY W/ HCPCS (ALT 636 FOR OP/ED): Performed by: PHYSICIAN ASSISTANT

## 2024-01-15 PROCEDURE — 64450 NJX AA&/STRD OTHER PN/BRANCH: CPT | Performed by: ANESTHESIOLOGY

## 2024-01-15 RX ORDER — METHYLPREDNISOLONE ACETATE 40 MG/ML
40 INJECTION, SUSPENSION INTRA-ARTICULAR; INTRALESIONAL; INTRAMUSCULAR; SOFT TISSUE ONCE
Status: COMPLETED | OUTPATIENT
Start: 2024-01-15 | End: 2024-01-15

## 2024-01-15 RX ORDER — BUPIVACAINE HYDROCHLORIDE 2.5 MG/ML
INJECTION, SOLUTION INFILTRATION; PERINEURAL AS NEEDED
Status: COMPLETED | OUTPATIENT
Start: 2024-01-15 | End: 2024-01-15

## 2024-01-15 RX ORDER — BUPIVACAINE HYDROCHLORIDE 2.5 MG/ML
8 INJECTION, SOLUTION EPIDURAL; INFILTRATION; INTRACAUDAL ONCE
Status: DISCONTINUED | OUTPATIENT
Start: 2024-01-15 | End: 2024-01-16 | Stop reason: HOSPADM

## 2024-01-15 RX ADMIN — BUPIVACAINE HYDROCHLORIDE 8 ML: 2.5 INJECTION, SOLUTION INFILTRATION; PERINEURAL at 10:03

## 2024-01-15 RX ADMIN — METHYLPREDNISOLONE ACETATE 40 MG: 40 INJECTION, SUSPENSION INTRA-ARTICULAR; INTRALESIONAL; INTRAMUSCULAR; INTRASYNOVIAL; SOFT TISSUE at 10:03

## 2024-01-15 ASSESSMENT — PAIN DESCRIPTION - DESCRIPTORS: DESCRIPTORS: ACHING;SORE

## 2024-01-15 ASSESSMENT — PAIN - FUNCTIONAL ASSESSMENT
PAIN_FUNCTIONAL_ASSESSMENT: 0-10
PAIN_FUNCTIONAL_ASSESSMENT: 0-10

## 2024-01-15 ASSESSMENT — PAIN SCALES - GENERAL
PAINLEVEL_OUTOF10: 7
PAINLEVEL_OUTOF10: 3

## 2024-01-15 NOTE — PERIOPERATIVE NURSING NOTE
Discharge instructions reviewed by  Alissa GARCIA RN no questions and verbalized understanding.   discharged amb to exit steady gait,  to be driven home by family michelet well

## 2024-01-15 NOTE — Clinical Note
Pt arrived to OR on cart. Transferred to OR table. Safety strap applied. Pt LATERAL, LEFT SIDE DOWN. LATERAL  POSITIONER IN PLACE. PILLOW  UNDER HEAD. Pt prepped with Chloraprep. Bandaids applied to injection sites. Pt. Transferred to ACS on cart.

## 2024-01-15 NOTE — OP NOTE
Date: 01/15/24   OR Location: [facility] OR    Name: Savita Keys  : 1975 Age: 48 y.o.  MRN: [id number]  Sex: female     Diagnosis  Coccydynia    Procedures  Bilateral sacrococcygeal nerve block    Surgeons   Daniel Riggs MD       Procedure Summary  Anesthesia: Local  ASA: ASA status not filed in the log.  Anesthesia Staff: Dr. Riggs  Estimated Blood Loss: 0 mL  Intra-op Medications: * Intraprocedure medication information is unavailable because the case start and end events have not been set *      Intraprocedure I/O Totals       None           Specimen: No specimens collected       Indications: Savita Keys is an 48 y.o. female who is having a bilateral sacrococcygeal nerve block for coccydynia..  The patient was seen in the preoperative area. The risks, benefits, complications, treatment options, non-operative alternatives, expected recovery and outcomes were discussed with the patient. The possibilities of reaction to medication, injury to surrounding structures, bleeding and infection were discussed with the patient. The patient concurred with the proposed plan, giving informed consent.  The site of surgery was properly noted/marked.     Procedure Details: The risk and benefits of a coccygeal nerve block were discussed at length with the patient and informed consent was obtained.  All questions were answered satisfactorily prior to going back to the operating room.  The patient was taken back to the op room and placed on the left lateral decubitus position.  The coccyx was palpated and marked with indelible surgical marker.  Then the area was prepped with a ChloraPrep swab.  Drapes were applied.  The coccyx was palpated at the midline distally.  Using a 1.5 inch number 25-gauge needle the needle was placed in contact with the coccyx and then walked off to the right side.  After negative aspiration for blood a total of 4 cc of 0.25% bupivacaine +20 mg of Depo-Medrol was injected.  Then the needle was  walked off to the left side and the procedure was repeated with a total of 4 cc of 0.25% bupivacaine +20 mg of Depo-Medrol injected after negative aspiration for blood.  The patient tolerated procedure well and was taken to the recovery room in good condition without complication.   Complications:  None; patient tolerated the procedure well.    Disposition: PACU - hemodynamically stable.  Condition: stable         Additional Details:       Daniel Riggs MD

## 2024-01-24 ENCOUNTER — APPOINTMENT (OUTPATIENT)
Dept: PAIN MEDICINE | Facility: CLINIC | Age: 49
End: 2024-01-24
Payer: COMMERCIAL

## 2024-02-06 ENCOUNTER — OFFICE VISIT (OUTPATIENT)
Dept: PAIN MEDICINE | Facility: CLINIC | Age: 49
End: 2024-02-06
Payer: COMMERCIAL

## 2024-02-06 VITALS
SYSTOLIC BLOOD PRESSURE: 104 MMHG | BODY MASS INDEX: 24.14 KG/M2 | HEIGHT: 69 IN | RESPIRATION RATE: 20 BRPM | WEIGHT: 163 LBS | DIASTOLIC BLOOD PRESSURE: 72 MMHG | HEART RATE: 108 BPM

## 2024-02-06 DIAGNOSIS — M25.511 CHRONIC RIGHT SHOULDER PAIN: ICD-10-CM

## 2024-02-06 DIAGNOSIS — G89.29 CHRONIC RIGHT SHOULDER PAIN: ICD-10-CM

## 2024-02-06 DIAGNOSIS — M19.019 SHOULDER ARTHRITIS: Primary | ICD-10-CM

## 2024-02-06 DIAGNOSIS — M53.3 COCCYDYNIA: ICD-10-CM

## 2024-02-06 PROCEDURE — 20610 DRAIN/INJ JOINT/BURSA W/O US: CPT | Performed by: PHYSICIAN ASSISTANT

## 2024-02-06 PROCEDURE — 2500000004 HC RX 250 GENERAL PHARMACY W/ HCPCS (ALT 636 FOR OP/ED): Performed by: PHYSICIAN ASSISTANT

## 2024-02-06 RX ORDER — TRIAMCINOLONE ACETONIDE 40 MG/ML
40 INJECTION, SUSPENSION INTRA-ARTICULAR; INTRAMUSCULAR ONCE
Status: COMPLETED | OUTPATIENT
Start: 2024-02-06 | End: 2024-02-06

## 2024-02-06 RX ORDER — BUPIVACAINE HYDROCHLORIDE 5 MG/ML
4 INJECTION, SOLUTION EPIDURAL; INTRACAUDAL ONCE
Status: COMPLETED | OUTPATIENT
Start: 2024-02-06 | End: 2024-02-06

## 2024-02-06 RX ADMIN — BUPIVACAINE HYDROCHLORIDE 20 MG: 5 INJECTION, SOLUTION EPIDURAL; INTRACAUDAL; PERINEURAL at 11:05

## 2024-02-06 RX ADMIN — TRIAMCINOLONE ACETONIDE 40 MG: 40 INJECTION, SUSPENSION INTRA-ARTICULAR; INTRAMUSCULAR at 11:04

## 2024-02-06 ASSESSMENT — ENCOUNTER SYMPTOMS
EYES NEGATIVE: 1
HEMATOLOGIC/LYMPHATIC NEGATIVE: 1
ALLERGIC/IMMUNOLOGIC NEGATIVE: 1
GASTROINTESTINAL NEGATIVE: 1
BACK PAIN: 1
PSYCHIATRIC NEGATIVE: 1
CARDIOVASCULAR NEGATIVE: 1
ARTHRALGIAS: 1
RESPIRATORY NEGATIVE: 1
ENDOCRINE NEGATIVE: 1
CONSTITUTIONAL NEGATIVE: 1

## 2024-02-06 ASSESSMENT — PAIN SCALES - GENERAL: PAINLEVEL: 9

## 2024-02-06 NOTE — PROGRESS NOTES
Patient ID: Savita Keys is a 48 y.o. female.    Joint Injection/Aspiration    Date/Time: 2/6/2024 11:13 AM    Performed by: Karoline Gonzalez PA-C  Authorized by: Karoline Gonzalez PA-C    Consent:     Consent obtained:  Written    Consent given by:  Patient    Risks discussed:  Bleeding, infection and pain    Alternatives discussed:  No treatment and delayed treatment  Universal protocol:     Patient identity confirmed:  Verbally with patient  Location:     Location:  Shoulder    Shoulder:  R glenohumeral  Anesthesia:     Anesthesia method:  None  Procedure details:     Ultrasound guidance: no      Approach:  Posterior    Steroid injected: yes      Specimen collected: no    Post-procedure details:     Dressing:  Adhesive bandage    Procedure completion:  Tolerated well, no immediate complications  Comments:      For mL of 0.5% bupivacaine and 40 mg of Kenalog injected into right glenohumeral joint without problem.

## 2024-02-06 NOTE — PROGRESS NOTES
Subjective   Patient ID: Savita Keys is a 48 y.o. female who presents for Tailbone Pain (Patient complains of pain in her tailbone.  She recently had a Bilat Coccygeal nerve block that she states she got 60% relief from.  Patient states that she gets pain if she sits too long.  She describes it as a shocking pain.  She rates her pain a 6/10 today.) and Shoulder Pain (Patient complains of right shoulder pain.  She rates her pain a 3/10 when sitting or a 9/10 when moving her arm.  She denied radiation.  She denied numbness or tingling.  She states that she attempted to start her home exercise program which helped her pain in the past but the pain was too severe.).    JOHN 20.  Fall screen NA due to age.  Alcohol screen completed, negative.      Karina Haro RN 02/06/24 10:59 AM     Patient is a 48-year-old female.  She presents today to undergo a repeat right shoulder injection.  She also is following up from her recent bilateral coccygeal nerve block.  This was done on 1/15/2024 and gave her 60% relief.  At this time, her right shoulder is what is bothering her the most.  She rates it a 7/10.  It affects her ability to do certain things.  It affects her quality of life.  It affects her activities.  It affects her ability to do the things she wants to do.  Previous shoulder injection gave significant relief.  Well over 50% for well over 3 months.  She is just here today to get this done again as she is getting ready to go on vacation.  She is going to be going to [place].  She is excited about this.  It should be noted that historically, her conservative treatments including therapy and over-the-counter medications have not helped.  The most.  She has gotten has been from the coccygeal nerve block and the shoulder injections.        Review of Systems   Constitutional: Negative.    HENT: Negative.     Eyes: Negative.    Respiratory: Negative.     Cardiovascular: Negative.    Gastrointestinal: Negative.     Endocrine: Negative.    Genitourinary: Negative.    Musculoskeletal:  Positive for arthralgias, back pain and gait problem.   Skin: Negative.    Allergic/Immunologic: Negative.    Hematological: Negative.    Psychiatric/Behavioral: Negative.         Objective   Physical Exam  Vitals and nursing note reviewed.   Constitutional:       Appearance: Normal appearance. She is normal weight.   HENT:      Head: Normocephalic and atraumatic.      Right Ear: External ear normal.      Left Ear: External ear normal.      Nose: Nose normal.      Mouth/Throat:      Pharynx: Oropharynx is clear.   Eyes:      Pupils: Pupils are equal, round, and reactive to light.   Cardiovascular:      Rate and Rhythm: Normal rate and regular rhythm.      Pulses: Normal pulses.   Musculoskeletal:         General: Normal range of motion.      Cervical back: Normal range of motion.      Comments: 5/5 strength throughout  Pain with internal and external rotation of the right shoulder   Skin:     General: Skin is warm and dry.   Neurological:      General: No focal deficit present.      Mental Status: She is alert and oriented to person, place, and time. Mental status is at baseline.   Psychiatric:         Mood and Affect: Mood normal.         Behavior: Behavior normal.         Thought Content: Thought content normal.         Judgment: Judgment normal.         Assessment/Plan   Diagnoses and all orders for this visit:  Shoulder arthritis  -     bupivacaine PF (Marcaine) 0.5 % (5 mg/mL) injection 20 mg  -     triamcinolone acetonide (Kenalog-40) injection 40 mg  Chronic right shoulder pain  Coccydynia       Patient is a 48-year-old female with a past medical history significant for coccydynia and right shoulder pain as well as right shoulder arthritis.  Patient underwent recent bilateral coccygeal nerve block with 60% relief.  We discussed that this can be repeated every 3 months as needed.  Patient voiced understanding and would like to keep a close  eye on this.  Patient underwent right shoulder injection today without problem.  Please refer to separate report.  She will follow-up in 8 to 10 weeks.  Call the clinic sooner if necessary.

## 2024-04-09 ENCOUNTER — OFFICE VISIT (OUTPATIENT)
Dept: PAIN MEDICINE | Facility: CLINIC | Age: 49
End: 2024-04-09
Payer: COMMERCIAL

## 2024-04-09 VITALS
SYSTOLIC BLOOD PRESSURE: 104 MMHG | RESPIRATION RATE: 14 BRPM | WEIGHT: 165 LBS | DIASTOLIC BLOOD PRESSURE: 71 MMHG | HEART RATE: 99 BPM | BODY MASS INDEX: 24.37 KG/M2

## 2024-04-09 DIAGNOSIS — M25.511 CHRONIC RIGHT SHOULDER PAIN: Primary | ICD-10-CM

## 2024-04-09 DIAGNOSIS — M53.3 COCCYDYNIA: ICD-10-CM

## 2024-04-09 DIAGNOSIS — M19.019 SHOULDER ARTHRITIS: ICD-10-CM

## 2024-04-09 DIAGNOSIS — G89.29 CHRONIC RIGHT SHOULDER PAIN: Primary | ICD-10-CM

## 2024-04-09 PROCEDURE — 99214 OFFICE O/P EST MOD 30 MIN: CPT | Performed by: PHYSICIAN ASSISTANT

## 2024-04-09 ASSESSMENT — ENCOUNTER SYMPTOMS
EYES NEGATIVE: 1
GASTROINTESTINAL NEGATIVE: 1
CARDIOVASCULAR NEGATIVE: 1
NEUROLOGICAL NEGATIVE: 1
PSYCHIATRIC NEGATIVE: 1
MYALGIAS: 1
ARTHRALGIAS: 1
ALLERGIC/IMMUNOLOGIC NEGATIVE: 1
ENDOCRINE NEGATIVE: 1
RESPIRATORY NEGATIVE: 1
HEMATOLOGIC/LYMPHATIC NEGATIVE: 1
CONSTITUTIONAL NEGATIVE: 1

## 2024-04-09 ASSESSMENT — COLUMBIA-SUICIDE SEVERITY RATING SCALE - C-SSRS
2. HAVE YOU ACTUALLY HAD ANY THOUGHTS OF KILLING YOURSELF?: NO
1. IN THE PAST MONTH, HAVE YOU WISHED YOU WERE DEAD OR WISHED YOU COULD GO TO SLEEP AND NOT WAKE UP?: NO
6. HAVE YOU EVER DONE ANYTHING, STARTED TO DO ANYTHING, OR PREPARED TO DO ANYTHING TO END YOUR LIFE?: NO

## 2024-04-09 NOTE — PROGRESS NOTES
Subjective   Patient ID: Savita Keys is a 49 y.o. female who presents for Shoulder Pain (FUV rt shoulder injection at last [facility] reports 90% relief in pain and no pain today. ) and Tailbone Pain (Today she reports having pain in her Coccyx stating the injection wore off, usually with Dr. Villavicencio the injections  lasted longer, rates her pain score  6-7/10 now and 8-9/10 at the end of the day, describes as aching when sitting and shocks her when she gets up. She is taking Ibuprofen, sitting on her sides to avoid  sitting on her tail bone and can only lay on her sides nothing really helps the pain only the coccygeal Nerve Block. )JOHN score 36%,  ORT score 1, alcohol screen negative.     Kamille Toribio RN 04/09/24 1:22 PM     Patient is a 49-year-old female.  She presents today after undergoing a right shoulder injection.  This was done on 2/6/2024 and has given her 90% relief of her right shoulder pain.  At this time unfortunately her coccygeal pain has returned.  She most recently underwent bilateral coccygeal nerve block.  This was done on 1/15/2024 and gave her 60%.  This worked well for her but unfortunate, has started to return.  She rates her discomfort a 6-9/10 and she is hopeful to get set up to have it done once again as it is aching when she sits in certain positions and she gets a shocking type sensation when she moves a certain way and does certain things.  It should be noted that historically, her conservative treatments including therapy and over-the-counter medications have not helped.  The most.  She has gotten has been from the coccygeal nerve block.            Review of Systems   Constitutional: Negative.    HENT: Negative.     Eyes: Negative.    Respiratory: Negative.     Cardiovascular: Negative.    Gastrointestinal: Negative.    Endocrine: Negative.    Genitourinary: Negative.    Musculoskeletal:  Positive for arthralgias and myalgias.   Skin: Negative.    Allergic/Immunologic: Negative.     Neurological: Negative.    Hematological: Negative.    Psychiatric/Behavioral: Negative.         Objective   Physical Exam  Vitals and nursing note reviewed.   Constitutional:       Appearance: Normal appearance. She is normal weight.   HENT:      Head: Normocephalic and atraumatic.      Right Ear: External ear normal.      Left Ear: External ear normal.      Nose: Nose normal.      Mouth/Throat:      Pharynx: Oropharynx is clear.   Eyes:      Conjunctiva/sclera: Conjunctivae normal.   Cardiovascular:      Rate and Rhythm: Normal rate and regular rhythm.      Pulses: Normal pulses.   Pulmonary:      Effort: Pulmonary effort is normal.   Musculoskeletal:         General: Normal range of motion.      Cervical back: Normal range of motion.      Comments: 5/5 strength throughout   Skin:     General: Skin is warm and dry.   Neurological:      General: No focal deficit present.      Mental Status: She is alert and oriented to person, place, and time. Mental status is at baseline.   Psychiatric:         Mood and Affect: Mood normal.         Behavior: Behavior normal.         Thought Content: Thought content normal.         Judgment: Judgment normal.       XR SACRUM COCCYX 2+ VIEWS  Status: Final result     Signed by    Signed Time Phone Pager   Daniela Sin MD 5/24/2022 17:41 [phone]     Exam Information    Status Exam Begun Exam Ended   Final 5/23/2022 13:59 5/23/2022 14:16     Study Result    Narrative & Impression   MRN: [id number]  Patient Name: ELLY PERRY     STUDY:  Sacrum/coccyx, three views.     INDICATION:  pain  M53.3: Coccydynia.     COMPARISON:  01/16/2015     ACCESSION NUMBER(S):  [id number]     ORDERING CLINICIAN:  SOLIS CORDERO     FINDINGS:  Pelvic ring is intact.  No displaced sacral or coccygeal fractures.  Mild bilateral SI joint degenerative changes with periarticular  sclerosis.  Redemonstration of congenital fusion versus advanced lumbar spine  degenerative changes at L4-5 with  disc height loss.  Soft tissues are unremarkable.     IMPRESSION:  Mild bilateral sacroiliac joint degenerative changes.  Redemonstration of congenital fusion versus advanced lumbar spine  degenerative changes at L4-5.     Assessment/Plan   Diagnoses and all orders for this visit:  Chronic right shoulder pain  Coccydynia  Shoulder arthritis       Patient is a 49-year-old female following up after undergoing a right shoulder injection on 2/6/2024 that gave her 90% relief.  Prior to that coccygeal nerve block gave her 60% relief for 3 months.  Unfortunate, her pain is started to return.  She has coccygeal pain that is worse with certain movements and sitting in certain positions.  It is an aching type pain that can become a stabbing type discomfort with what she is doing in her positions.  At this time, we discussed repeating the bilateral coccygeal nerve block under fluoroscopy once again for therapeutic purposes as this has worked well for her in the past.  Diagnosis-M53.3-coccydynia.  Procedure was discussed.  Risk and benefits were discussed.  Patient is agreeable.  She will follow-up 2 weeks after the injection for reevaluation.  Call clinic sooner if necessary.

## 2024-04-15 ENCOUNTER — TELEPHONE (OUTPATIENT)
Dept: PAIN MEDICINE | Facility: CLINIC | Age: 49
End: 2024-04-15
Payer: COMMERCIAL

## 2024-04-22 ENCOUNTER — TELEPHONE (OUTPATIENT)
Dept: PAIN MEDICINE | Facility: CLINIC | Age: 49
End: 2024-04-22
Payer: COMMERCIAL

## 2024-04-22 DIAGNOSIS — G43.809 OTHER MIGRAINE WITHOUT STATUS MIGRAINOSUS, NOT INTRACTABLE: Primary | ICD-10-CM

## 2024-04-23 RX ORDER — ELETRIPTAN HYDROBROMIDE 40 MG/1
40 TABLET, FILM COATED ORAL ONCE AS NEEDED
Qty: 6 TABLET | Refills: 0 | Status: SHIPPED | OUTPATIENT
Start: 2024-04-23

## 2024-04-30 ENCOUNTER — PREP FOR PROCEDURE (OUTPATIENT)
Dept: PAIN MEDICINE | Facility: CLINIC | Age: 49
End: 2024-04-30
Payer: COMMERCIAL

## 2024-04-30 DIAGNOSIS — M53.3 COCCYODYNIA: Primary | ICD-10-CM

## 2024-04-30 RX ORDER — LIDOCAINE HYDROCHLORIDE 20 MG/ML
1 INJECTION, SOLUTION EPIDURAL; INFILTRATION; INTRACAUDAL; PERINEURAL ONCE
Status: CANCELLED | OUTPATIENT
Start: 2024-05-17 | End: 2024-04-30

## 2024-04-30 RX ORDER — DEXAMETHASONE SODIUM PHOSPHATE 10 MG/ML
10 INJECTION INTRAMUSCULAR; INTRAVENOUS ONCE
Status: CANCELLED | OUTPATIENT
Start: 2024-05-17

## 2024-04-30 RX ORDER — SODIUM CHLORIDE 9 MG/ML
1 INJECTION, SOLUTION INTRAMUSCULAR; INTRAVENOUS; SUBCUTANEOUS ONCE
Status: CANCELLED | OUTPATIENT
Start: 2024-05-17 | End: 2024-04-30

## 2024-04-30 RX ORDER — LIDOCAINE HYDROCHLORIDE 20 MG/ML
6 INJECTION, SOLUTION EPIDURAL; INFILTRATION; INTRACAUDAL; PERINEURAL ONCE
Status: CANCELLED | OUTPATIENT
Start: 2024-05-17 | End: 2024-04-30

## 2024-05-17 ENCOUNTER — HOSPITAL ENCOUNTER (OUTPATIENT)
Dept: RADIOLOGY | Facility: HOSPITAL | Age: 49
Setting detail: OUTPATIENT SURGERY
Discharge: HOME | End: 2024-05-17
Payer: COMMERCIAL

## 2024-05-17 ENCOUNTER — HOSPITAL ENCOUNTER (OUTPATIENT)
Dept: OPERATING ROOM | Facility: HOSPITAL | Age: 49
Setting detail: OUTPATIENT SURGERY
Discharge: HOME | End: 2024-05-17
Payer: COMMERCIAL

## 2024-05-17 VITALS
TEMPERATURE: 96.7 F | OXYGEN SATURATION: 100 % | RESPIRATION RATE: 20 BRPM | SYSTOLIC BLOOD PRESSURE: 96 MMHG | DIASTOLIC BLOOD PRESSURE: 78 MMHG | HEART RATE: 83 BPM

## 2024-05-17 DIAGNOSIS — M53.3 COCCYODYNIA: ICD-10-CM

## 2024-05-17 PROCEDURE — 2550000001 HC RX 255 CONTRASTS: Performed by: PHYSICIAN ASSISTANT

## 2024-05-17 PROCEDURE — 2500000004 HC RX 250 GENERAL PHARMACY W/ HCPCS (ALT 636 FOR OP/ED): Performed by: STUDENT IN AN ORGANIZED HEALTH CARE EDUCATION/TRAINING PROGRAM

## 2024-05-17 PROCEDURE — 64450 NJX AA&/STRD OTHER PN/BRANCH: CPT | Mod: 50 | Performed by: STUDENT IN AN ORGANIZED HEALTH CARE EDUCATION/TRAINING PROGRAM

## 2024-05-17 PROCEDURE — 2500000005 HC RX 250 GENERAL PHARMACY W/O HCPCS: Performed by: PHYSICIAN ASSISTANT

## 2024-05-17 PROCEDURE — 7100000009 HC PHASE TWO TIME - INITIAL BASE CHARGE

## 2024-05-17 PROCEDURE — 7100000010 HC PHASE TWO TIME - EACH INCREMENTAL 1 MINUTE

## 2024-05-17 RX ORDER — SODIUM CHLORIDE 9 MG/ML
1 INJECTION, SOLUTION INTRAMUSCULAR; INTRAVENOUS; SUBCUTANEOUS ONCE
Status: DISCONTINUED | OUTPATIENT
Start: 2024-05-17 | End: 2024-05-18 | Stop reason: HOSPADM

## 2024-05-17 RX ORDER — LIDOCAINE HYDROCHLORIDE 20 MG/ML
6 INJECTION, SOLUTION EPIDURAL; INFILTRATION; INTRACAUDAL; PERINEURAL ONCE
Status: COMPLETED | OUTPATIENT
Start: 2024-05-17 | End: 2024-05-17

## 2024-05-17 RX ORDER — LIDOCAINE HYDROCHLORIDE 20 MG/ML
1 INJECTION, SOLUTION EPIDURAL; INFILTRATION; INTRACAUDAL; PERINEURAL ONCE
Status: DISCONTINUED | OUTPATIENT
Start: 2024-05-17 | End: 2024-05-18 | Stop reason: HOSPADM

## 2024-05-17 RX ORDER — DEXAMETHASONE SODIUM PHOSPHATE 10 MG/ML
10 INJECTION INTRAMUSCULAR; INTRAVENOUS ONCE
Status: DISCONTINUED | OUTPATIENT
Start: 2024-05-17 | End: 2024-05-18 | Stop reason: HOSPADM

## 2024-05-17 RX ADMIN — LIDOCAINE HYDROCHLORIDE 5 ML: 20 INJECTION, SOLUTION EPIDURAL; INFILTRATION; INTRACAUDAL; PERINEURAL at 09:30

## 2024-05-17 RX ADMIN — IOHEXOL 6 ML: 300 INJECTION, SOLUTION INTRAVENOUS at 09:29

## 2024-05-17 RX ADMIN — DEXAMETHASONE SODIUM PHOSPHATE 10 MG: 4 INJECTION, SOLUTION INTRAMUSCULAR; INTRAVENOUS at 09:31

## 2024-05-17 ASSESSMENT — PAIN SCALES - GENERAL
PAINLEVEL_OUTOF10: 6
PAINLEVEL_OUTOF10: 6

## 2024-05-17 ASSESSMENT — PAIN - FUNCTIONAL ASSESSMENT
PAIN_FUNCTIONAL_ASSESSMENT: 0-10
PAIN_FUNCTIONAL_ASSESSMENT: 0-10

## 2024-05-17 NOTE — OP NOTE
* No procedures listed * Operative Note     Date: 2024  OR Location: [place] [address] OR    Name: Savita Keys, : 1975, Age: 49 y.o., MRN: [id number], Sex: female    Diagnosis  * No Diagnosis Codes entered * * No Diagnosis Codes entered *     Procedures  * No procedures documented on diagnosis form *    Surgeons   * No surgeons found in log *    Resident/Fellow/Other Assistant:  * No surgeons found in log *    Procedure Summary  Anesthesia: * No anesthesia type entered *  ASA: ASA status not filed in the log.  Anesthesia Staff: No anesthesia staff entered.  Estimated Blood Loss: 0mL  Intra-op Medications: * Intraprocedure medication information is unavailable because the case start and end events have not been set *      Intraprocedure I/O Totals       None           Specimen: No specimens collected     Staff:   Circulator: Valdo Veloz RN; Tavia Caballero RN  Scrub Person: Judi Zelaya RN         Drains and/or Catheters: * None in log *    Tourniquet Times:         Implants:     Findings: none    Indications: Savita Keys is an 49 y.o. female who is having surgery for * No pre-op diagnosis entered *. Tailbone pain    The patient was seen in the preoperative area. The risks, benefits, complications, treatment options, non-operative alternatives, expected recovery and outcomes were discussed with the patient. The possibilities of reaction to medication, pulmonary aspiration, injury to surrounding structures, bleeding, recurrent infection, the need for additional procedures, failure to diagnose a condition, and creating a complication requiring transfusion or operation were discussed with the patient. The patient concurred with the proposed plan, giving informed consent.  The site of surgery was properly noted/marked if necessary per policy. The patient has been actively warmed in preoperative area. Preoperative antibiotics are not indicated. Venous thrombosis prophylaxis are not  indicated.    Procedure Details: Diagnosis: M53.3, sacrococcygeal disorder    Procedure: Bilateral coccygeal nerve blocks under fluoroscopic guidance    Anesthesia: Local    Complications: None    The patient was met in the preoperative holding area. After the risks, benefits, and alternatives to the procedure were discussed with the patient, informed consent was then obtained. The patient was brought back to the procedure room and placed in the prone position on the fluoroscopy table. The coccygeal area was exposed, prepped and draped in the usual sterile fashion. Using fluoroscopic guidance, the midline coccygeal disk was identified. The skin and subcutaneous tissues were anesthetized using 0.5% lidocaine. A 22-gauge spinal needle was inserted in the skin and advanced through the disk and placed next to expected anatomic location of the ganglion impar. Once in this location, Omnipaque contrast was injected, which showed appropriate spread up and down in front of the coccygeal bones. There was no intravascular uptake. Next, 2mL of 0.5% bupivacaine with 1mL of 10mg/mL dexamethasone was injected into the appropriate location. [place] were then removed.  Patient tolerated the procedure well.  Patient was then taken to the PACU in stable condition.    Complications:  None; patient tolerated the procedure well.    Disposition:  home  Condition: stable         Additional Details:      Attending Attestation: I was present and scrubbed for the entire procedure.    *No primary surgeon found*

## 2024-05-17 NOTE — H&P
History Of Present Illness  Savita Keys is a 49 y.o. female presenting with pain.     Past Medical History  Past Medical History:   Diagnosis Date    Personal history of other diseases of the digestive system     History of constipation    Personal history of other diseases of the musculoskeletal system and connective tissue     History of fibromyalgia    Personal history of other mental and behavioral disorders     History of anxiety       Surgical History  Past Surgical History:   Procedure Laterality Date    ENDOMETRIAL ABLATION      IR INJECTION NERVE BLOCK Bilateral 01/15/2024    Bilat Coccygeal NB, 60% relief    OTHER SURGICAL HISTORY  10/15/2019    Endometrial ablation    OTHER SURGICAL HISTORY  10/15/2019    Epidural steroid injection    OTHER SURGICAL HISTORY  10/15/2019    Nerve block anesthesia    OTHER SURGICAL HISTORY  10/15/2019    Colonoscopy    OTHER SURGICAL HISTORY  10/07/2022    Diagnostic nerve block    OTHER SURGICAL HISTORY  06/10/2022    Diagnostic nerve block    OTHER SURGICAL HISTORY  01/03/2020    Diagnostic nerve block    OTHER SURGICAL HISTORY  02/18/2022    Diagnostic nerve block    OTHER SURGICAL HISTORY  09/13/2021    Diagnostic nerve block    OTHER SURGICAL HISTORY  04/02/2021    Diagnostic nerve block    OTHER SURGICAL HISTORY  05/21/2020    Diagnostic nerve block        Social History  She reports that she has been smoking cigarettes. She has never used smokeless tobacco. She reports current alcohol use. She reports that she does not use drugs.    Family History  Family History   Problem Relation Name Age of Onset    Breast cancer Sister      Breast cancer Mother's Sister      Breast cancer Paternal Grandmother          Allergies  Sulfa (sulfonamide antibiotics) and Zonisamide    Review of Systems   All other systems reviewed and are negative.       Physical Exam     Last Recorded Vitals  Blood pressure 111/68, pulse 84, temperature 37.2 °C (98.9 °F), temperature source  Temporal, resp. rate 18, last menstrual period 04/29/2024, SpO2 100%.    Relevant Results      Constitutional: No acute distress, well appearing and well nourished. Patient appears stated age.  Eyes:  nonicteric sclerae   ENT: Hearing is grossly intact.  Neck: trachea midline  Head and Face: grossly normal.  Respiratory: nonlabored breathing  Cardiovascular: rate per vitals.  Neuro: alert, moving extremities.       Assessment/Plan   Active Problems:  There are no active Hospital Problems.      Sherine mcelroy I spent   minutes in the professional and overall care of this patient.      Adolfo Quezada, DO

## 2024-05-17 NOTE — NURSING NOTE
ARRIVED TO ROOM PER CART AND MOVED SELF TO TABLE.  PATIENT IN PRONE POSITION PADDED WITH PILLOWS, SAFETY STRAP APPLIED, PREPPED WITH TEAL CHLOROPREP PER DR NOGUERA,  FIRE RISK SCORE 2, OPSITE DRESSED WITH BANDAID, REPORT GIVEN TO COURTNEY SIDHU RN.

## 2024-06-19 ENCOUNTER — OFFICE VISIT (OUTPATIENT)
Dept: PAIN MEDICINE | Facility: CLINIC | Age: 49
End: 2024-06-19
Payer: COMMERCIAL

## 2024-06-19 VITALS
DIASTOLIC BLOOD PRESSURE: 79 MMHG | BODY MASS INDEX: 23.92 KG/M2 | SYSTOLIC BLOOD PRESSURE: 112 MMHG | RESPIRATION RATE: 14 BRPM | HEART RATE: 85 BPM | WEIGHT: 162 LBS

## 2024-06-19 DIAGNOSIS — M16.12 PRIMARY OSTEOARTHRITIS OF LEFT HIP: ICD-10-CM

## 2024-06-19 DIAGNOSIS — G89.29 CHRONIC LEFT SHOULDER PAIN: Primary | ICD-10-CM

## 2024-06-19 DIAGNOSIS — M25.512 CHRONIC LEFT SHOULDER PAIN: Primary | ICD-10-CM

## 2024-06-19 DIAGNOSIS — M53.3 COCCYDYNIA: ICD-10-CM

## 2024-06-19 PROCEDURE — 20610 DRAIN/INJ JOINT/BURSA W/O US: CPT | Performed by: STUDENT IN AN ORGANIZED HEALTH CARE EDUCATION/TRAINING PROGRAM

## 2024-06-19 PROCEDURE — 99212 OFFICE O/P EST SF 10 MIN: CPT | Performed by: STUDENT IN AN ORGANIZED HEALTH CARE EDUCATION/TRAINING PROGRAM

## 2024-06-19 PROCEDURE — 2500000004 HC RX 250 GENERAL PHARMACY W/ HCPCS (ALT 636 FOR OP/ED): Performed by: STUDENT IN AN ORGANIZED HEALTH CARE EDUCATION/TRAINING PROGRAM

## 2024-06-19 RX ORDER — BUPIVACAINE HYDROCHLORIDE 2.5 MG/ML
3 INJECTION, SOLUTION EPIDURAL; INFILTRATION; INTRACAUDAL ONCE
Status: COMPLETED | OUTPATIENT
Start: 2024-06-19 | End: 2024-06-19

## 2024-06-19 RX ORDER — TRIAMCINOLONE ACETONIDE 40 MG/ML
40 INJECTION, SUSPENSION INTRA-ARTICULAR; INTRAMUSCULAR ONCE
Status: COMPLETED | OUTPATIENT
Start: 2024-06-19 | End: 2024-06-19

## 2024-06-19 RX ORDER — BUPIVACAINE HYDROCHLORIDE 2.5 MG/ML
3 INJECTION, SOLUTION INFILTRATION; PERINEURAL ONCE
Status: DISCONTINUED | OUTPATIENT
Start: 2024-06-19 | End: 2024-06-19

## 2024-06-19 NOTE — PROGRESS NOTES
Subjective   Patient ID: Savita Keys is a 49 y.o. female who presents for Follow-up ([facility] for Coccygeal NB on 5/7/24 she reports great pain relief so far and still working. Today she  is having Lt shoulder pain that increases with with any movement, lifting and reaching across her body increases her pain rates her pain score 7-8/10 now and 9/10 at worst, describes as throbbing and shooting pain. She is taking Ibuprofen or Aleve these are not helping her pain. She is wanting to have a shoulder injection today. ) JOHN score 40%, SOAPP score 7.   HPI  Patient is following up status post bilateral coccygeal nerve block for coccygodynia, humidity 5% relief ongoing.  However her left shoulder is now more bothersome to her and this can be a 9/10 in severity pain.  We did review her MRI of her left shoulder from 2019 discussed that she has partial tears in her rotator cuff.  We discussed performing intra-articular corticosteroid injection left today the patient would like to do this.  Review of Systems   All other systems reviewed and are negative.      Objective   Physical Exam  Constitutional: No acute distress, well appearing and well nourished. Patient appears stated age.  Eyes: Conjunctiva non-icteric and eye lids are witPatient ID: Savita Keys is a 49 y.o. female.    Joint Injection/Aspiration    Date/Time: 6/19/2024 1:46 PM    Performed by: Adolfo Quezada DO  Authorized by: Adolfo Quezada DO    Consent:     Consent obtained:  Written    Consent given by:  Patient    Risks, benefits, and alternatives were discussed: yes      Risks discussed:  Infection and pain  Universal protocol:     Patient identity confirmed:  Verbally with patient  Location:     Location:  Shoulder    Shoulder:  L glenohumeral  Anesthesia:     Anesthesia method:  None  Procedure details:     Needle gauge: 27.    Approach:  Posterior    Steroid injected: yes      Specimen collected: no    Post-procedure details:     Dressing:   Adhesive bandage    Procedure completion:  Tolerated well, no immediate complications  Comments:      Left shoulder intra-articular corticosteroid was injected with 3 cc of 0.25% bupivacaine and 1 cc of 40 mg/mL of triamcinolone.  Skin was prepped with sterile alcohol, no complications.  hout obvious rash or drooping. Pupils are symmetric.  Ears, Nose, Mouth, and Throat: External ears and nose appear to be without deformity or rash. No lesions or masses noted.  Hearing is grossly intact.  Neck: No JVD noted, tracheal position is midline.  Head and Face: Examination of the head and face revealed no abnormalities.  Respiratory: No gasping or shortness of breath noted, no use of accessory muscles noted.  Cardiovascular: Examination for edema is normal.   GI: Abdomen nontender to palpation.  Skin: No rashes or open lesions/ulcers identified on examined areas.    MSK:   No asymmetry or masses noted of the musculature.   Examination of the muscles/joints/bones show grossly normal range of motion unless noted below.    Neurologic:  Motor strength:   5/5 muscle strength of the upper extremities bilateral and equal.  5/5 muscle strength of the lower extremities bilaterally and equal.     Sensation:   Sensation intact to light touch in the bilateral upper and lower extremities.    Provocative tests: Negative Shawn sign bilaterally, seated straight leg raise test not reproduce radicular symptoms bilaterally.  Mild tenderness palpation anterior and posteriorly to the left shoulder.    Psychiatric: Mood and affect are normal.    Assessment/Plan   Diagnoses and all orders for this visit:  Chronic left shoulder pain  -     triamcinolone acetonide (Kenalog-40) injection 40 mg  -     bupivacaine PF (Marcaine) 0.25 % (2.5 mg/mL) injection 7.5 mg  Primary osteoarthritis of left hip  -     Joint Injection/Aspiration; Future  Coccydynia  The patient´s history, physical exam and personal review of imaging indicate diagnoses of the  above items.    Plan description:  -Her tailbone pain is significantly better after the injection  -We performed a left shoulder intra-articular corticosteroid injection  -Follow-up in 6 months or sooner if needed      Counseling:  The patient was counseled regarding diagnostic results, instructions for management, risk factor reductions, prognosis, patient and family education, impressions, risk and benefits of treatment options, as well as adherence to current treatment regimen. The importance of physical therapy/core strengthening was discussed in regard to an appropriate level for the patient to participate in currently, as well as progress as able. Nicotine and alcohol use were reviewed and discussed as appropriate in relation to cessation and abstinence as indiciated, time spent for smoking cessation counseling when appropriate is 3-10 minutes. Appropriate use of opioid medications if prescribed as well as adherance and compliance to routine screening and avoidance of any medication that causes side effects in combination such as benzodiazepines. OARRS reviewed when indicated and naloxone offered if opioid medication prescribed.    All questions and concerns were addressed during clinical visit. Patient verbalized understanding and agreement with the current plan and counselling. The patient was invited to contact us back anytime with any questions or concerns and follow-up with us in the future.           Kamille Toribio RN 06/19/24 1:06 PM

## 2024-07-09 ENCOUNTER — TELEPHONE (OUTPATIENT)
Dept: PAIN MEDICINE | Facility: CLINIC | Age: 49
End: 2024-07-09
Payer: COMMERCIAL

## 2024-07-09 DIAGNOSIS — G43.809 OTHER MIGRAINE WITHOUT STATUS MIGRAINOSUS, NOT INTRACTABLE: ICD-10-CM

## 2024-07-09 RX ORDER — ELETRIPTAN HYDROBROMIDE 40 MG/1
40 TABLET, FILM COATED ORAL ONCE AS NEEDED
Qty: 18 TABLET | Refills: 0 | Status: SHIPPED | OUTPATIENT
Start: 2024-07-09 | End: 2024-07-12 | Stop reason: SDUPTHER

## 2024-07-12 RX ORDER — ELETRIPTAN HYDROBROMIDE 40 MG/1
40 TABLET, FILM COATED ORAL ONCE AS NEEDED
Qty: 18 TABLET | Refills: 0 | Status: SHIPPED | OUTPATIENT
Start: 2024-07-12 | End: 2024-10-10

## 2024-07-12 NOTE — TELEPHONE ENCOUNTER
RX WAS SENT TO [facility] IN ERROR, PATIENT DOES NOT USE MAIL ORDER FOR THIS MEDICATION, PLEASE SEND TO LOC[facility]- SHE IS OUT OF THE MEDICATION, STATES IT WAS TO HAVE BEEN SENT AT HER LAST OV    I SPOKE TO WILLIE AT [facility], THE SCRIPT WAS ALREADY MAILED OUT TO THE PATIENT TRACKING#[id number] USPS, CHECK ON USPS.[place], WILLIE STATES TO SEND IN A SHORT TERM SUPPLY TO LOC[facility]    I NOTIFIED PATIENT AND SHE IS ASKING IF YOU CAN SEND 2 DAY SUPPLY TO [facility] [place]

## 2024-09-19 ENCOUNTER — OFFICE VISIT (OUTPATIENT)
Dept: PAIN MEDICINE | Facility: CLINIC | Age: 49
End: 2024-09-19
Payer: COMMERCIAL

## 2024-09-19 VITALS
BODY MASS INDEX: 23.99 KG/M2 | HEIGHT: 69 IN | WEIGHT: 162 LBS | DIASTOLIC BLOOD PRESSURE: 64 MMHG | SYSTOLIC BLOOD PRESSURE: 96 MMHG | HEART RATE: 105 BPM | RESPIRATION RATE: 16 BRPM

## 2024-09-19 DIAGNOSIS — G89.29 CHRONIC LEFT SHOULDER PAIN: Primary | ICD-10-CM

## 2024-09-19 DIAGNOSIS — M53.3 COCCYDYNIA: ICD-10-CM

## 2024-09-19 DIAGNOSIS — I73.00 RAYNAUD'S PHENOMENON (SECONDARY): ICD-10-CM

## 2024-09-19 DIAGNOSIS — M25.512 CHRONIC LEFT SHOULDER PAIN: Primary | ICD-10-CM

## 2024-09-19 DIAGNOSIS — M35.00 SJOGREN'S SYNDROME, WITH UNSPECIFIED ORGAN INVOLVEMENT (MULTI): ICD-10-CM

## 2024-09-19 PROCEDURE — 2500000004 HC RX 250 GENERAL PHARMACY W/ HCPCS (ALT 636 FOR OP/ED): Performed by: PHYSICIAN ASSISTANT

## 2024-09-19 PROCEDURE — 20610 DRAIN/INJ JOINT/BURSA W/O US: CPT | Performed by: PHYSICIAN ASSISTANT

## 2024-09-19 RX ORDER — TRIAMCINOLONE ACETONIDE 40 MG/ML
40 INJECTION, SUSPENSION INTRA-ARTICULAR; INTRAMUSCULAR ONCE
OUTPATIENT
Start: 2024-09-19 | End: 2024-09-19

## 2024-09-19 RX ORDER — TRIAMCINOLONE ACETONIDE 40 MG/ML
40 INJECTION, SUSPENSION INTRA-ARTICULAR; INTRAMUSCULAR ONCE
Status: COMPLETED | OUTPATIENT
Start: 2024-09-19 | End: 2024-09-19

## 2024-09-19 RX ORDER — BUPIVACAINE HYDROCHLORIDE 5 MG/ML
2 INJECTION, SOLUTION EPIDURAL; INTRACAUDAL ONCE
Status: COMPLETED | OUTPATIENT
Start: 2024-09-19 | End: 2024-09-19

## 2024-09-19 RX ORDER — SODIUM CHLORIDE, SODIUM LACTATE, POTASSIUM CHLORIDE, CALCIUM CHLORIDE 600; 310; 30; 20 MG/100ML; MG/100ML; MG/100ML; MG/100ML
20 INJECTION, SOLUTION INTRAVENOUS CONTINUOUS
OUTPATIENT
Start: 2024-09-19

## 2024-09-19 RX ORDER — BUPIVACAINE HYDROCHLORIDE 2.5 MG/ML
9 INJECTION, SOLUTION EPIDURAL; INFILTRATION; INTRACAUDAL ONCE
OUTPATIENT
Start: 2024-09-19 | End: 2024-09-19

## 2024-09-19 ASSESSMENT — ENCOUNTER SYMPTOMS
BACK PAIN: 1
EYES NEGATIVE: 1
PSYCHIATRIC NEGATIVE: 1
CARDIOVASCULAR NEGATIVE: 1
GASTROINTESTINAL NEGATIVE: 1
NUMBNESS: 1
MYALGIAS: 1
ENDOCRINE NEGATIVE: 1
RESPIRATORY NEGATIVE: 1
ALLERGIC/IMMUNOLOGIC NEGATIVE: 1
ARTHRALGIAS: 1
CONSTITUTIONAL NEGATIVE: 1
WEAKNESS: 1
HEMATOLOGIC/LYMPHATIC NEGATIVE: 1

## 2024-09-19 ASSESSMENT — PAIN SCALES - GENERAL: PAINLEVEL: 7

## 2024-09-19 NOTE — PROGRESS NOTES
Patient ID: Savita Keys is a 49 y.o. female.    Joint Injection/Aspiration    Date/Time: 9/19/2024 2:04 PM    Performed by: Karoline Gonzalez PA-C  Authorized by: Karoline Gonzalez PA-C    Consent:     Consent obtained:  Written    Consent given by:  Patient    Risks discussed:  Bleeding, infection and pain    Alternatives discussed:  No treatment, delayed treatment, alternative treatment and observation  Universal protocol:     Patient identity confirmed:  Verbally with patient  Location:     Location:  Shoulder    Shoulder:  L glenohumeral  Anesthesia:     Anesthesia method:  None  Procedure details:     Preparation: Patient was prepped and draped in usual sterile fashion      Needle gauge:  20 G    Approach:  Posterior    Steroid injected: yes      Specimen collected: no    Post-procedure details:     Dressing:  Adhesive bandage    Procedure completion:  Tolerated well, no immediate complications  Comments:      3 mL of 0.5% bupivacaine and 40 mg of triamcinolone was injected into the left shoulder without problem.

## 2024-09-19 NOTE — PROGRESS NOTES
Subjective   Patient ID: Savita Keys is a 49 y.o. female who presents for Tailbone Pain (Patient complains of pain in her tailbone.  She states she received 95% relief from the last coccygeal nerve block she had and it last until recently.  Now her pain is starting to return.  She has pain in her tailbone.  She also has some left leg pain is related.  She rates this pain a 7/10 at this time.  ) and Shoulder Pain (Patient complains of left shoulder pain.  She denied radiation.  She denied numbness or tingling.  She states she received 80% relief from the last shoulder injection.  She rates this pain a 7/10.).    JOHN Score 40.    Karina Haro RN 09/19/24 1:49 PM     Patient is a 49-year-old female following up today earlier than originally scheduled.  She called in recently with complaints once again of left shoulder pain.  She has a history of left shoulder injection done on 6/19/2024 that gave her 80% relief up until the last few weeks when the pain has started to return.  She had 50% relief up until recently when it got really intense.  At this time, she has left shoulder pain worse with certain activities that she rates a 7/10.  She also has bilateral tailbone pain.  She is a history of bilateral coccygeal nerve block done on 5/7/2024 that gave her 95% relief up until last few weeks once again when this pain started to return as well.  She rates this pain a 7/10.  Worse with sitting, worse with being in certain positions.  Worse with activities.  She is here today to discuss having a repeat injection for this as well.  She has had injections in the past and they have worked well.  Conservative treatments have failed including a full course of physical therapy and she is eager to do these injections once again.        Review of Systems   Constitutional: Negative.    HENT: Negative.     Eyes: Negative.    Respiratory: Negative.     Cardiovascular: Negative.    Gastrointestinal: Negative.    Endocrine:  Negative.    Genitourinary: Negative.    Musculoskeletal:  Positive for arthralgias, back pain, gait problem and myalgias.   Skin: Negative.    Allergic/Immunologic: Negative.    Neurological:  Positive for weakness and numbness.   Hematological: Negative.    Psychiatric/Behavioral: Negative.         Objective   Physical Exam  Vitals and nursing note reviewed.   Constitutional:       General: She is not in acute distress.     Appearance: Normal appearance. She is not ill-appearing.   HENT:      Head: Normocephalic and atraumatic.      Right Ear: External ear normal.      Left Ear: External ear normal.      Nose: Nose normal.      Mouth/Throat:      Pharynx: Oropharynx is clear.   Eyes:      Conjunctiva/sclera: Conjunctivae normal.   Cardiovascular:      Rate and Rhythm: Normal rate and regular rhythm.      Pulses: Normal pulses.   Pulmonary:      Effort: Pulmonary effort is normal.      Breath sounds: Normal breath sounds.   Musculoskeletal:         General: Normal range of motion.      Cervical back: Normal range of motion.      Comments: 5/5 strength  Pain with certain movements of left shoulder including internal and external rotation   Skin:     General: Skin is warm and dry.   Neurological:      General: No focal deficit present.      Mental Status: She is alert and oriented to person, place, and time. Mental status is at baseline.   Psychiatric:         Mood and Affect: Mood normal.         Behavior: Behavior normal.         Thought Content: Thought content normal.         Judgment: Judgment normal.         Assessment/Plan   Diagnoses and all orders for this visit:  Chronic left shoulder pain  -     bupivacaine PF (Marcaine) 0.5 % (5 mg/mL) injection 10 mg  -     triamcinolone acetonide (Kenalog-40) injection 40 mg  Coccydynia  -     Nerve Block; Future  -     FL pain management; Future  Sjogren's syndrome, with unspecified organ involvement (Multi)  Raynaud's phenomenon (secondary)  Other orders  -     NPO  Diet Except: Sips with meds; Effective now; Standing  -     Height and weight; Standing  -     Insert and maintain peripheral IV; Standing  -     Saline lock IV; Standing  -     POCT pregnancy, urine; Standing  -     Type And Screen; Standing  -     lactated Ringer's infusion  -     Adult diet Regular; Standing  -     Vital Signs; Standing  -     Notify physician - Standard Parameters; Standing  -     Continue IV fluids ordered pre-procedure; Standing  -     Prior to Discharge O2 Weaning; Standing  -     Pulse oximetry, continuous; Standing  -     Discharge patient; Standing  -     iohexol (OMNIPaque) 300 mg iodine/mL solution 3 mL  -     bupivacaine PF (Marcaine) 0.25 % (2.5 mg/mL) injection 22.5 mg  -     triamcinolone acetonide (Kenalog-40) injection 40 mg       Patient is a 49-year-old female with a past medical history significant for the above-mentioned medical diagnoses following up today to once again undergo a left shoulder injection.  Please refer to separate report.  She is also here today to discuss once again pursuing repeat bilateral coccygeal nerve block as this was last done in May and gave her 95% relief up until recently when the pain started to return.  At this time, we will plan to pursue repeat bilateral coccygeal nerve block under fluoroscopy for diagnostic purposes.  Procedure was discussed.  Risk and benefits were discussed.  Patient will follow-up 2 weeks after the injection for reevaluation.  Call clinic sooner if necessary.

## 2024-09-20 ENCOUNTER — TELEPHONE (OUTPATIENT)
Dept: PAIN MEDICINE | Facility: CLINIC | Age: 49
End: 2024-09-20
Payer: COMMERCIAL

## 2024-09-20 NOTE — TELEPHONE ENCOUNTER
COCCYGEAL NB CPT 57064 CPT IN ORDER [id number]  DX M53.3  NO PRECERT IS REQUIRED [facility] REF# [id number]

## 2024-09-23 DIAGNOSIS — Z00.00 HEALTHCARE MAINTENANCE: ICD-10-CM

## 2024-09-23 RX ORDER — LEVOTHYROXINE SODIUM 25 UG/1
25 TABLET ORAL DAILY
Qty: 90 TABLET | Refills: 0 | Status: SHIPPED | OUTPATIENT
Start: 2024-09-23 | End: 2024-09-26 | Stop reason: SDUPTHER

## 2024-09-26 ENCOUNTER — OFFICE VISIT (OUTPATIENT)
Age: 49
End: 2024-09-26
Payer: COMMERCIAL

## 2024-09-26 VITALS
BODY MASS INDEX: 24.59 KG/M2 | HEART RATE: 93 BPM | WEIGHT: 166 LBS | OXYGEN SATURATION: 98 % | SYSTOLIC BLOOD PRESSURE: 128 MMHG | DIASTOLIC BLOOD PRESSURE: 80 MMHG | HEIGHT: 69 IN

## 2024-09-26 DIAGNOSIS — F17.210 CIGARETTE SMOKER: ICD-10-CM

## 2024-09-26 DIAGNOSIS — Z00.00 HEALTHCARE MAINTENANCE: ICD-10-CM

## 2024-09-26 DIAGNOSIS — Q87.19 SJOGREN-LARSSON SYNDROME (HHS-HCC): Primary | ICD-10-CM

## 2024-09-26 DIAGNOSIS — E78.2 HYPERLIPIDEMIA, MIXED: ICD-10-CM

## 2024-09-26 DIAGNOSIS — E03.8 HYPOTHYROIDISM, SECONDARY: ICD-10-CM

## 2024-09-26 PROCEDURE — 99214 OFFICE O/P EST MOD 30 MIN: CPT | Performed by: INTERNAL MEDICINE

## 2024-09-26 PROCEDURE — 3008F BODY MASS INDEX DOCD: CPT | Performed by: INTERNAL MEDICINE

## 2024-09-26 RX ORDER — LEVOTHYROXINE SODIUM 25 UG/1
25 TABLET ORAL DAILY
Qty: 90 TABLET | Refills: 3 | Status: SHIPPED | OUTPATIENT
Start: 2024-09-26

## 2024-09-26 ASSESSMENT — ENCOUNTER SYMPTOMS
ABDOMINAL PAIN: 0
FATIGUE: 0
PALPITATIONS: 0
DIARRHEA: 0
BLOOD IN STOOL: 0
VOMITING: 0
COUGH: 0
WHEEZING: 0
NAUSEA: 0
SHORTNESS OF BREATH: 0
BACK PAIN: 1

## 2024-09-26 NOTE — PATIENT INSTRUCTIONS
As we discussed I have ordered lab work to be done at your earliest convenience.  You will need to go fasting because it include is a cholesterol and thyroid blood test  I sent a refill in for your thyroid medication and enough to last 1 year.  Please get your lab work done soon because of possible we may need to adjust the dose.  Please check with your insurance company regarding colonoscopy and if you would like to schedule it sooner please call  I have ordered a low resolution CT scan for lung cancer screening and if necessary you can call to cancel the test.  Please work on becoming smoke-free and if there is anything that we can do to help you please let us know.

## 2024-09-26 NOTE — ASSESSMENT & PLAN NOTE
-She will go for a TSH soon and I have agreed to come factor with results  -If her numbers look fine we will check it once a year

## 2024-09-26 NOTE — PROGRESS NOTES
Subjective   Patient ID: Savita Keys is a 49 y.o. female who presents for Follow-up.  HPI  She is here today for her routine checkup.  She is looking well and for the most part reports feeling well.  She still has her back pain issues and shoulder issues.  She is going to the pain clinic and she receives treatment periodically.  We did conduct a review of systems.  We talked about getting a thyroid blood test because it has been a little over a year since it was last checked.  We are providing a refill on her thyroid medicine today.  We talked about cancer screening and her last colonoscopy was in December 2015.  Theoretically she is due in December 2025.  She states she is feeling a little bit anxious about screening because a very good friend has colon cancer.  I told her I be happy to order the test but she wants to check with insurance for.  She also smokes and collectively has smoked for about 20 years.  We talked about the guidelines for screening for lung cancer and I am ordering a low resolution CT scan.  She will check with her insurance company and if necessary she can cancel the test.  We talked about the extreme importance of quitting smoking.  I had several suggestions including the nicotine patches and gum.  We also discussed possibly going up on her Wellbutrin.  We also discussed the smoking cessation programs.  We decided that it would be safer to return in 1 year or sooner if any problems.  We are also adding a lipid profile to her laboratory testing.  Review of Systems   Constitutional:  Negative for fatigue.   Respiratory:  Negative for cough, shortness of breath and wheezing.    Cardiovascular:  Negative for chest pain, palpitations and leg swelling.   Gastrointestinal:  Negative for abdominal pain, blood in stool, diarrhea, nausea and vomiting.   Musculoskeletal:  Positive for back pain.     Objective   Physical Exam  Vitals and nursing note reviewed.   Constitutional:       General: She  is not in acute distress.     Appearance: Normal appearance.   HENT:      Head: Normocephalic and atraumatic.   Eyes:      Conjunctiva/sclera: Conjunctivae normal.   Cardiovascular:      Rate and Rhythm: Normal rate and regular rhythm.      Heart sounds: Normal heart sounds.   Pulmonary:      Effort: No respiratory distress.      Breath sounds: No wheezing.   Abdominal:      Palpations: Abdomen is soft.      Tenderness: There is no abdominal tenderness. There is no guarding.   Musculoskeletal:         General: No swelling. Normal range of motion.   Skin:     General: Skin is warm and dry.   Neurological:      General: No focal deficit present.      Mental Status: She is alert and oriented to person, place, and time.   Psychiatric:         Behavior: Behavior normal.         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     -Is stable and we will continue to monitor         Cigarette smoker F17.210     -I am ordering a low resolution CT scan  -We talked about smoking cessation         Relevant Orders    CT lung screening low dose    Hyperlipidemia, mixed E78.2     -She will go for a fasting profile in the near future and have agreed to call contact her with results         Relevant Orders    Lipid Panel     Other Visit Diagnoses         Codes    Healthcare maintenance     Z00.00    Relevant Medications    levothyroxine (Synthroid, Levoxyl) 25 mcg tablet        PT INSTRUCTIONS  As we discussed I have ordered lab work to be done at your earliest convenience.  You will need to go fasting because it include is a cholesterol and thyroid blood test  I sent a refill in for your thyroid medication and enough to last 1 year.  Please get your  lab work done soon because of possible we may need to adjust the dose.  Please check with your insurance company regarding colonoscopy and if you would like to schedule it sooner please call  I have ordered a low resolution CT scan for lung cancer screening and if necessary you can call to cancel the test.  Please work on becoming smoke-free and if there is anything that we can do to help you please let us know.       Camila Wilkinson, DO

## 2024-09-26 NOTE — ASSESSMENT & PLAN NOTE
-She will go for a fasting profile in the near future and have agreed to call contact her with results

## 2024-09-27 ENCOUNTER — TELEPHONE (OUTPATIENT)
Age: 49
End: 2024-09-27
Payer: COMMERCIAL

## 2024-09-27 DIAGNOSIS — F17.210 CIGARETTE SMOKER: Primary | ICD-10-CM

## 2024-09-27 NOTE — TELEPHONE ENCOUNTER
Patient called to let you know that after speaking with her  she has decided not to do the CT scan of her lungs at this time.

## 2024-10-02 ENCOUNTER — APPOINTMENT (OUTPATIENT)
Dept: RADIOLOGY | Facility: HOSPITAL | Age: 49
End: 2024-10-02
Payer: COMMERCIAL

## 2024-10-22 ENCOUNTER — PREP FOR PROCEDURE (OUTPATIENT)
Dept: PAIN MEDICINE | Facility: CLINIC | Age: 49
End: 2024-10-22
Payer: COMMERCIAL

## 2024-10-22 DIAGNOSIS — M53.3 COCCYDYNIA: Primary | ICD-10-CM

## 2024-10-22 RX ORDER — LIDOCAINE HYDROCHLORIDE 20 MG/ML
10 INJECTION, SOLUTION EPIDURAL; INFILTRATION; INTRACAUDAL; PERINEURAL ONCE
Status: CANCELLED | OUTPATIENT
Start: 2024-10-25 | End: 2024-10-22

## 2024-10-22 RX ORDER — BUPIVACAINE HYDROCHLORIDE 5 MG/ML
3 INJECTION, SOLUTION EPIDURAL; INTRACAUDAL ONCE
Status: CANCELLED | OUTPATIENT
Start: 2024-10-25 | End: 2024-10-22

## 2024-10-25 ENCOUNTER — HOSPITAL ENCOUNTER (OUTPATIENT)
Dept: OPERATING ROOM | Facility: HOSPITAL | Age: 49
Setting detail: OUTPATIENT SURGERY
Discharge: HOME | End: 2024-10-25
Payer: COMMERCIAL

## 2024-10-25 VITALS
SYSTOLIC BLOOD PRESSURE: 107 MMHG | HEIGHT: 69 IN | RESPIRATION RATE: 20 BRPM | DIASTOLIC BLOOD PRESSURE: 78 MMHG | BODY MASS INDEX: 23.7 KG/M2 | HEART RATE: 87 BPM | OXYGEN SATURATION: 99 % | TEMPERATURE: 98.3 F | WEIGHT: 160 LBS

## 2024-10-25 DIAGNOSIS — M53.3 COCCYDYNIA: ICD-10-CM

## 2024-10-25 PROCEDURE — 77002 NEEDLE LOCALIZATION BY XRAY: CPT | Performed by: STUDENT IN AN ORGANIZED HEALTH CARE EDUCATION/TRAINING PROGRAM

## 2024-10-25 PROCEDURE — 2500000004 HC RX 250 GENERAL PHARMACY W/ HCPCS (ALT 636 FOR OP/ED): Performed by: STUDENT IN AN ORGANIZED HEALTH CARE EDUCATION/TRAINING PROGRAM

## 2024-10-25 PROCEDURE — 2550000001 HC RX 255 CONTRASTS: Performed by: STUDENT IN AN ORGANIZED HEALTH CARE EDUCATION/TRAINING PROGRAM

## 2024-10-25 RX ORDER — DEXAMETHASONE SODIUM PHOSPHATE 10 MG/ML
INJECTION INTRAMUSCULAR; INTRAVENOUS AS NEEDED
Status: COMPLETED | OUTPATIENT
Start: 2024-10-25 | End: 2024-10-25

## 2024-10-25 RX ORDER — BUPIVACAINE HYDROCHLORIDE 5 MG/ML
INJECTION, SOLUTION EPIDURAL; INTRACAUDAL AS NEEDED
Status: COMPLETED | OUTPATIENT
Start: 2024-10-25 | End: 2024-10-25

## 2024-10-25 RX ORDER — LIDOCAINE HYDROCHLORIDE 20 MG/ML
INJECTION, SOLUTION EPIDURAL; INFILTRATION; INTRACAUDAL; PERINEURAL AS NEEDED
Status: COMPLETED | OUTPATIENT
Start: 2024-10-25 | End: 2024-10-25

## 2024-10-25 ASSESSMENT — PATIENT HEALTH QUESTIONNAIRE - PHQ9
2. FEELING DOWN, DEPRESSED OR HOPELESS: NOT AT ALL
SUM OF ALL RESPONSES TO PHQ9 QUESTIONS 1 AND 2: 0
1. LITTLE INTEREST OR PLEASURE IN DOING THINGS: NOT AT ALL

## 2024-10-25 ASSESSMENT — PAIN - FUNCTIONAL ASSESSMENT
PAIN_FUNCTIONAL_ASSESSMENT: 0-10

## 2024-10-25 ASSESSMENT — PAIN SCALES - GENERAL
PAINLEVEL_OUTOF10: 5 - MODERATE PAIN
PAINLEVEL_OUTOF10: 5 - MODERATE PAIN

## 2024-10-25 ASSESSMENT — PAIN DESCRIPTION - DESCRIPTORS: DESCRIPTORS: ACHING

## 2024-10-25 NOTE — PERIOPERATIVE NURSING NOTE
Patient sitting on side of bed.  Tolerated well.  Discharge instructions reviewed with patient and a copy provided to patient.

## 2024-10-25 NOTE — H&P
History Of Present Illness  Savita Kesy is a 49 y.o. female presenting with pain.     Past Medical History  Past Medical History:   Diagnosis Date    Personal history of other diseases of the digestive system     History of constipation    Personal history of other diseases of the musculoskeletal system and connective tissue     History of fibromyalgia    Personal history of other mental and behavioral disorders     History of anxiety       Surgical History  Past Surgical History:   Procedure Laterality Date    ENDOMETRIAL ABLATION      IR INJECTION NERVE BLOCK Bilateral 01/15/2024    Bilat Coccygeal NB, 60% relief    IR INJECTION NERVE BLOCK Bilateral 05/17/2024    Coccygeal nerve block    OTHER SURGICAL HISTORY  10/15/2019    Endometrial ablation    OTHER SURGICAL HISTORY  10/15/2019    Epidural steroid injection    OTHER SURGICAL HISTORY  10/15/2019    Nerve block anesthesia    OTHER SURGICAL HISTORY  10/15/2019    Colonoscopy    OTHER SURGICAL HISTORY  10/07/2022    Diagnostic nerve block    OTHER SURGICAL HISTORY  06/10/2022    Diagnostic nerve block    OTHER SURGICAL HISTORY  01/03/2020    Diagnostic nerve block    OTHER SURGICAL HISTORY  02/18/2022    Diagnostic nerve block    OTHER SURGICAL HISTORY  09/13/2021    Diagnostic nerve block    OTHER SURGICAL HISTORY  04/02/2021    Diagnostic nerve block    OTHER SURGICAL HISTORY  05/21/2020    Diagnostic nerve block        Social History  She reports that she has been smoking cigarettes. She has never used smokeless tobacco. She reports current alcohol use. She reports that she does not use drugs.    Family History  Family History   Problem Relation Name Age of Onset    Breast cancer Sister      Breast cancer Mother's Sister      Breast cancer Paternal Grandmother          Allergies  Sulfa (sulfonamide antibiotics) and Zonisamide    Review of Systems   All other systems reviewed and are negative.       Physical Exam     Last Recorded Vitals  There were no  vitals taken for this visit.    Relevant Results        Constitutional: No acute distress, well appearing and well nourished. Patient appears stated age.  Eyes: Conjunctiva non-icteric and eye lids are without obvious rash or drooping. Pupils are symmetric.  Ears, Nose, Mouth, and Throat: External ears and nose appear to be without deformity or rash. No lesions or masses noted.  Hearing is grossly intact.  Neck: No JVD noted, tracheal position is midline.  Head and Face: Examination of the head and face revealed no abnormalities.  Respiratory: No gasping or shortness of breath noted, no use of accessory muscles noted.  Cardiovascular: Examination for edema is normal.   GI: Abdomen nontender to palpation.  Skin: No rashes or open lesions/ulcers identified on examined areas.    MSK:   No asymmetry or masses noted of the musculature.   Examination of the muscles/joints/bones show grossly normal range of motion unless noted below.    Neurologic:  Motor strength:   5/5 muscle strength of the upper extremities bilateral and equal.  5/5 muscle strength of the lower extremities bilaterally and equal.     Sensation:   Sensation intact to light touch in the bilateral upper and lower extremities.    Provocative tests:     Psychiatric: Mood and affect are normal.         Assessment/Plan   Assessment & Plan  Coccydynia      Cocycgeal nb       I spent   minutes in the professional and overall care of this patient.      Adolfo Quezada, DO

## 2024-12-10 ENCOUNTER — APPOINTMENT (OUTPATIENT)
Dept: PAIN MEDICINE | Facility: CLINIC | Age: 49
End: 2024-12-10
Payer: COMMERCIAL

## 2025-01-08 ENCOUNTER — DOCUMENTATION (OUTPATIENT)
Dept: PAIN MEDICINE | Facility: CLINIC | Age: 50
End: 2025-01-08
Payer: COMMERCIAL

## 2025-01-08 ENCOUNTER — OFFICE VISIT (OUTPATIENT)
Dept: PAIN MEDICINE | Facility: CLINIC | Age: 50
End: 2025-01-08
Payer: COMMERCIAL

## 2025-01-08 VITALS
SYSTOLIC BLOOD PRESSURE: 114 MMHG | DIASTOLIC BLOOD PRESSURE: 74 MMHG | RESPIRATION RATE: 16 BRPM | WEIGHT: 164 LBS | BODY MASS INDEX: 24.02 KG/M2 | HEART RATE: 101 BPM

## 2025-01-08 DIAGNOSIS — G89.29 CHRONIC LEFT SHOULDER PAIN: ICD-10-CM

## 2025-01-08 DIAGNOSIS — M25.512 CHRONIC LEFT SHOULDER PAIN: ICD-10-CM

## 2025-01-08 DIAGNOSIS — M54.12 CERVICAL RADICULITIS: ICD-10-CM

## 2025-01-08 DIAGNOSIS — M53.3 COCCYDYNIA: Primary | ICD-10-CM

## 2025-01-08 PROCEDURE — 2500000004 HC RX 250 GENERAL PHARMACY W/ HCPCS (ALT 636 FOR OP/ED): Performed by: STUDENT IN AN ORGANIZED HEALTH CARE EDUCATION/TRAINING PROGRAM

## 2025-01-08 PROCEDURE — 20610 DRAIN/INJ JOINT/BURSA W/O US: CPT | Mod: LT | Performed by: STUDENT IN AN ORGANIZED HEALTH CARE EDUCATION/TRAINING PROGRAM

## 2025-01-08 PROCEDURE — 99213 OFFICE O/P EST LOW 20 MIN: CPT | Mod: 25 | Performed by: STUDENT IN AN ORGANIZED HEALTH CARE EDUCATION/TRAINING PROGRAM

## 2025-01-08 RX ORDER — TRIAMCINOLONE ACETONIDE 40 MG/ML
40 INJECTION, SUSPENSION INTRA-ARTICULAR; INTRAMUSCULAR ONCE
Status: COMPLETED | OUTPATIENT
Start: 2025-01-08 | End: 2025-01-08

## 2025-01-08 RX ORDER — BUPIVACAINE HYDROCHLORIDE 2.5 MG/ML
3 INJECTION, SOLUTION EPIDURAL; INFILTRATION; INTRACAUDAL ONCE
Status: COMPLETED | OUTPATIENT
Start: 2025-01-08 | End: 2025-01-08

## 2025-01-08 RX ADMIN — BUPIVACAINE HYDROCHLORIDE 7.5 MG: 2.5 INJECTION, SOLUTION EPIDURAL; INFILTRATION; INTRACAUDAL; PERINEURAL at 12:02

## 2025-01-08 RX ADMIN — TRIAMCINOLONE ACETONIDE 40 MG: 40 INJECTION, SUSPENSION INTRA-ARTICULAR; INTRAMUSCULAR at 12:01

## 2025-01-08 ASSESSMENT — COLUMBIA-SUICIDE SEVERITY RATING SCALE - C-SSRS
2. HAVE YOU ACTUALLY HAD ANY THOUGHTS OF KILLING YOURSELF?: NO
6. HAVE YOU EVER DONE ANYTHING, STARTED TO DO ANYTHING, OR PREPARED TO DO ANYTHING TO END YOUR LIFE?: NO
1. IN THE PAST MONTH, HAVE YOU WISHED YOU WERE DEAD OR WISHED YOU COULD GO TO SLEEP AND NOT WAKE UP?: NO

## 2025-01-08 ASSESSMENT — PATIENT HEALTH QUESTIONNAIRE - PHQ9
SUM OF ALL RESPONSES TO PHQ9 QUESTIONS 1 AND 2: 0
2. FEELING DOWN, DEPRESSED OR HOPELESS: NOT AT ALL
1. LITTLE INTEREST OR PLEASURE IN DOING THINGS: NOT AT ALL

## 2025-01-08 ASSESSMENT — PAIN SCALES - GENERAL: PAINLEVEL_OUTOF10: 7

## 2025-01-08 NOTE — H&P (VIEW-ONLY)
Subjective   Patient ID: Savita Keys is a 49 y.o. female who presents for Shoulder Pain (ONGOING LEFT SHOULDER PAIN, HER LAST CORTISONE SHOT IN HER SHOULDER 9/19/24 did not offer as much significant improvement did not offer is much as frequent as she would like.  Additionally, she has had pain radiating all the way down to her fingertips during this time period as well. PAIN WITH LIFTING, REACHING, PUSHING,PULLING, CARRYING WITH THE LEFT ARM, ). NOTES SHARP PAINS DOWN INTO HER ARMS WITH TINGLING TO HER FINGERS, PAIN SCORE 8/10 SHE NOTES ONGOING TAILBONE PAIN THAT IS ACHING AND TENDER WITH THROBBING PAIN, SHE SHE TRANSITIONS SHE GETS SHOOTING PAINS UP THE TAILBONE, PAIN SCORE 7/10, SHE HAS BEEN TAKING 2 ACETAMINOPHEN WITH 1 IBUPROFEN FOR PAIN RELIEF, HEAT FOR RELIEF TWICE DAILY, DEEP TISSUE MASSAGE EVERY 5 WEEKS, JOHN=46%, COMM=4  HPI  Patient is presenting with multiple complaints, left shoulder pain.  Pain radiating from her shoulder all the way down to her fingertips that can occasionally be a tingling sensation Tim is significantly less frequently than her persistent left shoulder pain that is localized to her shoulder.  Additionally, she has coccygodynia and has had a recent exacerbation of this pain where she states that is very painful to sit she gets a shooting pain in her tailbone that is a 7/10 severity.  She has had good results with bilateral coccygeal nerve blocks in the past.  She would like to consider these as well.  Review of Systems   All other systems reviewed and are negative.  Patient ID: Savita Keys is a 49 y.o. female.    Joint Injection/Aspiration    Date/Time: 1/8/2025 12:14 PM    Performed by: Adolfo Quezada DO  Authorized by: Adolfo Quezada DO    Consent:     Consent obtained:  Written    Consent given by:  Patient    Risks, benefits, and alternatives were discussed: yes      Risks discussed:  Bleeding, infection and pain    Alternatives discussed:  No treatment and  observation  Universal protocol:     Procedure explained and questions answered to patient or proxy's satisfaction: yes      Patient identity confirmed:  Verbally with patient  Location:     Location:  Shoulder    Shoulder:  L glenohumeral  Procedure details:     Approach:  Posterior    Steroid injected: yes      Specimen collected: no    Post-procedure details:     Dressing:  Adhesive bandage    Procedure completion:  Tolerated  Comments:      The left glenohumeral joint was cleaned with sterile alcohol swab and the injection was performed under strict aseptic technique.  A posterior approach was utilized and the needle was placed directly into the glenohumeral joint.  no blood was aspirated.  3 cc of 0.25% bupivacaine with 40 mg/mL of triamcinolone was injected.  Patient tolerated the procedure well without complication.      Procedure code 05555, left shoulder joint    Objective   Physical Exam  Constitutional: No acute distress, well appearing and well nourished. Patient appears stated age.  Eyes: Conjunctiva non-icteric and eye lids are without obvious rash or drooping. Pupils are symmetric.  Ears, Nose, Mouth, and Throat: External ears and nose appear to be without deformity or rash. No lesions or masses noted.  Hearing is grossly intact.  Neck: No JVD noted, tracheal position is midline.  Head and Face: Examination of the head and face revealed no abnormalities.  Respiratory: No gasping or shortness of breath noted, no use of accessory muscles noted.  Cardiovascular: Examination for edema is normal.   GI: Abdomen nontender to palpation.  Skin: No rashes or open lesions/ulcers identified on examined areas.    MSK:   No asymmetry or masses noted of the musculature.   Examination of the muscles/joints/bones show grossly normal range of motion unless noted below.    Neurologic:  Motor strength:   5/5 muscle strength of the upper extremities bilateral and equal.  5/5 muscle strength of the lower extremities  bilaterally and equal.     Sensation:   Sensation intact to light touch in the bilateral upper and lower extremities.   Provocative tests: Negative Shawn sign bilaterally. Tenderness palpation the left shoulder anteriorly.  Tenderness palpation directly over the coccygeal region.    Psychiatric: Mood and affect are normal.  Assessment/Plan   Diagnoses and all orders for this visit:  Coccydynia  Chronic left shoulder pain  -     bupivacaine PF 0.25 % (Marcaine) 0.25 % (2.5 mg/mL) injection 7.5 mg  -     triamcinolone acetonide (Kenalog-40) injection 40 mg  Cervical radiculitis  -     XR cervical spine complete 4-5 views; Future  The patient´s history, physical exam and personal review of imaging indicate diagnoses of the above items.    Plan description:  -Left shoulder intra-articular corticosteroid injection was performed today to address her left shoulder pain  -Cervical spine x-ray ordered, we may consider cervical MRI versus EMG if she has persistent radicular symptoms on the left  -We will schedule her for bilateral coccygeal nerve blocks under fluoroscopic guidance to address her tailbone symptoms as this has helped in the past and she would like to repeat this  -Follow-up 1 month postinjection or sooner if any issues arise      Counseling:  The patient was counseled regarding diagnostic results, instructions for management, risk factor reductions, prognosis, patient and family education, impressions, risk and benefits of treatment options, as well as adherence to current treatment regimen. The importance of physical therapy/core strengthening was discussed in regard to an appropriate level for the patient to participate in currently, as well as progress as able. Nicotine and alcohol use were reviewed and discussed as appropriate in relation to cessation and abstinence as indiciated, time spent for smoking cessation counseling when appropriate is 3-10 minutes. Appropriate use of opioid medications if  prescribed as well as adherance and compliance to routine screening and avoidance of any medication that causes side effects in combination such as benzodiazepines. OARRS reviewed when indicated and naloxone offered if opioid medication prescribed.    All questions and concerns were addressed during clinical visit. Patient verbalized understanding and agreement with the current plan and counselling. The patient was invited to contact us back anytime with any questions or concerns and follow-up with us in the future.           Tiffani Crystal CMA 01/08/25 11:11 AM

## 2025-01-08 NOTE — PROGRESS NOTES
Subjective   Patient ID: Savita Keys is a 49 y.o. female who presents for Shoulder Pain (ONGOING LEFT SHOULDER PAIN, HER LAST CORTISONE SHOT IN HER SHOULDER 9/19/24 did not offer as much significant improvement did not offer is much as frequent as she would like.  Additionally, she has had pain radiating all the way down to her fingertips during this time period as well. PAIN WITH LIFTING, REACHING, PUSHING,PULLING, CARRYING WITH THE LEFT ARM, ). NOTES SHARP PAINS DOWN INTO HER ARMS WITH TINGLING TO HER FINGERS, PAIN SCORE 8/10 SHE NOTES ONGOING TAILBONE PAIN THAT IS ACHING AND TENDER WITH THROBBING PAIN, SHE SHE TRANSITIONS SHE GETS SHOOTING PAINS UP THE TAILBONE, PAIN SCORE 7/10, SHE HAS BEEN TAKING 2 ACETAMINOPHEN WITH 1 IBUPROFEN FOR PAIN RELIEF, HEAT FOR RELIEF TWICE DAILY, DEEP TISSUE MASSAGE EVERY 5 WEEKS, JOHN=46%, COMM=4  HPI  Patient is presenting with multiple complaints, left shoulder pain.  Pain radiating from her shoulder all the way down to her fingertips that can occasionally be a tingling sensation Tim is significantly less frequently than her persistent left shoulder pain that is localized to her shoulder.  Additionally, she has coccygodynia and has had a recent exacerbation of this pain where she states that is very painful to sit she gets a shooting pain in her tailbone that is a 7/10 severity.  She has had good results with bilateral coccygeal nerve blocks in the past.  She would like to consider these as well.  Review of Systems   All other systems reviewed and are negative.  Patient ID: Savita Keys is a 49 y.o. female.    Joint Injection/Aspiration    Date/Time: 1/8/2025 12:14 PM    Performed by: Adolfo Quezada DO  Authorized by: Adolfo Quezada DO    Consent:     Consent obtained:  Written    Consent given by:  Patient    Risks, benefits, and alternatives were discussed: yes      Risks discussed:  Bleeding, infection and pain    Alternatives discussed:  No treatment and  observation  Universal protocol:     Procedure explained and questions answered to patient or proxy's satisfaction: yes      Patient identity confirmed:  Verbally with patient  Location:     Location:  Shoulder    Shoulder:  L glenohumeral  Procedure details:     Approach:  Posterior    Steroid injected: yes      Specimen collected: no    Post-procedure details:     Dressing:  Adhesive bandage    Procedure completion:  Tolerated  Comments:      The left glenohumeral joint was cleaned with sterile alcohol swab and the injection was performed under strict aseptic technique.  A posterior approach was utilized and the needle was placed directly into the glenohumeral joint.  no blood was aspirated.  3 cc of 0.25% bupivacaine with 40 mg/mL of triamcinolone was injected.  Patient tolerated the procedure well without complication.      Procedure code 70304, left shoulder joint    Objective   Physical Exam  Constitutional: No acute distress, well appearing and well nourished. Patient appears stated age.  Eyes: Conjunctiva non-icteric and eye lids are without obvious rash or drooping. Pupils are symmetric.  Ears, Nose, Mouth, and Throat: External ears and nose appear to be without deformity or rash. No lesions or masses noted.  Hearing is grossly intact.  Neck: No JVD noted, tracheal position is midline.  Head and Face: Examination of the head and face revealed no abnormalities.  Respiratory: No gasping or shortness of breath noted, no use of accessory muscles noted.  Cardiovascular: Examination for edema is normal.   GI: Abdomen nontender to palpation.  Skin: No rashes or open lesions/ulcers identified on examined areas.    MSK:   No asymmetry or masses noted of the musculature.   Examination of the muscles/joints/bones show grossly normal range of motion unless noted below.    Neurologic:  Motor strength:   5/5 muscle strength of the upper extremities bilateral and equal.  5/5 muscle strength of the lower extremities  bilaterally and equal.     Sensation:   Sensation intact to light touch in the bilateral upper and lower extremities.   Provocative tests: Negative Shawn sign bilaterally. Tenderness palpation the left shoulder anteriorly.  Tenderness palpation directly over the coccygeal region.    Psychiatric: Mood and affect are normal.  Assessment/Plan   Diagnoses and all orders for this visit:  Coccydynia  Chronic left shoulder pain  -     bupivacaine PF 0.25 % (Marcaine) 0.25 % (2.5 mg/mL) injection 7.5 mg  -     triamcinolone acetonide (Kenalog-40) injection 40 mg  Cervical radiculitis  -     XR cervical spine complete 4-5 views; Future  The patient´s history, physical exam and personal review of imaging indicate diagnoses of the above items.    Plan description:  -Left shoulder intra-articular corticosteroid injection was performed today to address her left shoulder pain  -Cervical spine x-ray ordered, we may consider cervical MRI versus EMG if she has persistent radicular symptoms on the left  -We will schedule her for bilateral coccygeal nerve blocks under fluoroscopic guidance to address her tailbone symptoms as this has helped in the past and she would like to repeat this  -Follow-up 1 month postinjection or sooner if any issues arise      Counseling:  The patient was counseled regarding diagnostic results, instructions for management, risk factor reductions, prognosis, patient and family education, impressions, risk and benefits of treatment options, as well as adherence to current treatment regimen. The importance of physical therapy/core strengthening was discussed in regard to an appropriate level for the patient to participate in currently, as well as progress as able. Nicotine and alcohol use were reviewed and discussed as appropriate in relation to cessation and abstinence as indiciated, time spent for smoking cessation counseling when appropriate is 3-10 minutes. Appropriate use of opioid medications if  prescribed as well as adherance and compliance to routine screening and avoidance of any medication that causes side effects in combination such as benzodiazepines. OARRS reviewed when indicated and naloxone offered if opioid medication prescribed.    All questions and concerns were addressed during clinical visit. Patient verbalized understanding and agreement with the current plan and counselling. The patient was invited to contact us back anytime with any questions or concerns and follow-up with us in the future.           Tiffani Crystal CMA 01/08/25 11:11 AM

## 2025-01-09 RX ORDER — LIDOCAINE HYDROCHLORIDE 20 MG/ML
6 INJECTION, SOLUTION EPIDURAL; INFILTRATION; INTRACAUDAL; PERINEURAL ONCE
OUTPATIENT
Start: 2025-01-09

## 2025-01-09 RX ORDER — BUPIVACAINE HYDROCHLORIDE 2.5 MG/ML
5 INJECTION, SOLUTION EPIDURAL; INFILTRATION; INTRACAUDAL ONCE
OUTPATIENT
Start: 2025-01-09

## 2025-01-09 RX ORDER — METHYLPREDNISOLONE ACETATE 40 MG/ML
60 INJECTION, SUSPENSION INTRA-ARTICULAR; INTRALESIONAL; INTRAMUSCULAR; SOFT TISSUE ONCE
OUTPATIENT
Start: 2025-01-09

## 2025-01-10 ENCOUNTER — TELEPHONE (OUTPATIENT)
Dept: PAIN MEDICINE | Facility: CLINIC | Age: 50
End: 2025-01-10
Payer: COMMERCIAL

## 2025-01-24 ENCOUNTER — HOSPITAL ENCOUNTER (OUTPATIENT)
Dept: OPERATING ROOM | Facility: HOSPITAL | Age: 50
Setting detail: OUTPATIENT SURGERY
Discharge: HOME | End: 2025-01-24
Payer: COMMERCIAL

## 2025-01-24 VITALS
RESPIRATION RATE: 16 BRPM | HEIGHT: 69 IN | WEIGHT: 162 LBS | BODY MASS INDEX: 23.99 KG/M2 | SYSTOLIC BLOOD PRESSURE: 92 MMHG | DIASTOLIC BLOOD PRESSURE: 57 MMHG | TEMPERATURE: 98.8 F | OXYGEN SATURATION: 98 % | HEART RATE: 82 BPM

## 2025-01-24 DIAGNOSIS — G89.29 CHRONIC LEFT SHOULDER PAIN: ICD-10-CM

## 2025-01-24 DIAGNOSIS — M53.3 COCCYDYNIA: ICD-10-CM

## 2025-01-24 DIAGNOSIS — M54.12 CERVICAL RADICULITIS: ICD-10-CM

## 2025-01-24 DIAGNOSIS — M25.512 CHRONIC LEFT SHOULDER PAIN: ICD-10-CM

## 2025-01-24 PROCEDURE — 64450 NJX AA&/STRD OTHER PN/BRANCH: CPT | Mod: 50 | Performed by: STUDENT IN AN ORGANIZED HEALTH CARE EDUCATION/TRAINING PROGRAM

## 2025-01-24 PROCEDURE — 2500000004 HC RX 250 GENERAL PHARMACY W/ HCPCS (ALT 636 FOR OP/ED): Performed by: STUDENT IN AN ORGANIZED HEALTH CARE EDUCATION/TRAINING PROGRAM

## 2025-01-24 PROCEDURE — 2550000001 HC RX 255 CONTRASTS: Performed by: STUDENT IN AN ORGANIZED HEALTH CARE EDUCATION/TRAINING PROGRAM

## 2025-01-24 RX ORDER — METHYLPREDNISOLONE ACETATE 40 MG/ML
60 INJECTION, SUSPENSION INTRA-ARTICULAR; INTRALESIONAL; INTRAMUSCULAR; SOFT TISSUE ONCE
Status: DISCONTINUED | OUTPATIENT
Start: 2025-01-24 | End: 2025-01-25 | Stop reason: HOSPADM

## 2025-01-24 RX ORDER — LIDOCAINE HYDROCHLORIDE 20 MG/ML
6 INJECTION, SOLUTION EPIDURAL; INFILTRATION; INTRACAUDAL; PERINEURAL ONCE
Status: COMPLETED | OUTPATIENT
Start: 2025-01-24 | End: 2025-01-24

## 2025-01-24 RX ORDER — BUPIVACAINE HYDROCHLORIDE 2.5 MG/ML
5 INJECTION, SOLUTION EPIDURAL; INFILTRATION; INTRACAUDAL ONCE
Status: DISCONTINUED | OUTPATIENT
Start: 2025-01-24 | End: 2025-01-25 | Stop reason: HOSPADM

## 2025-01-24 RX ORDER — BUPIVACAINE HYDROCHLORIDE 5 MG/ML
INJECTION, SOLUTION PERINEURAL AS NEEDED
Status: COMPLETED | OUTPATIENT
Start: 2025-01-24 | End: 2025-01-24

## 2025-01-24 RX ORDER — BUPIVACAINE HYDROCHLORIDE 2.5 MG/ML
9 INJECTION, SOLUTION EPIDURAL; INFILTRATION; INTRACAUDAL ONCE
Status: DISCONTINUED | OUTPATIENT
Start: 2025-01-24 | End: 2025-01-25 | Stop reason: HOSPADM

## 2025-01-24 RX ORDER — DEXAMETHASONE SODIUM PHOSPHATE 10 MG/ML
INJECTION INTRAMUSCULAR; INTRAVENOUS AS NEEDED
Status: COMPLETED | OUTPATIENT
Start: 2025-01-24 | End: 2025-01-24

## 2025-01-24 RX ORDER — TRIAMCINOLONE ACETONIDE 40 MG/ML
40 INJECTION, SUSPENSION INTRA-ARTICULAR; INTRAMUSCULAR ONCE
Status: DISCONTINUED | OUTPATIENT
Start: 2025-01-24 | End: 2025-01-25 | Stop reason: HOSPADM

## 2025-01-24 RX ADMIN — LIDOCAINE HYDROCHLORIDE 2 ML: 20 INJECTION, SOLUTION EPIDURAL; INFILTRATION; INTRACAUDAL; PERINEURAL at 12:10

## 2025-01-24 RX ADMIN — BUPIVACAINE HYDROCHLORIDE 2 ML: 5 INJECTION, SOLUTION PERINEURAL at 12:11

## 2025-01-24 RX ADMIN — IOHEXOL 0.5 ML: 300 INJECTION, SOLUTION INTRAVENOUS at 12:10

## 2025-01-24 RX ADMIN — DEXAMETHASONE SODIUM PHOSPHATE 10 MG: 10 INJECTION, SOLUTION INTRAMUSCULAR; INTRAVENOUS at 12:10

## 2025-01-24 ASSESSMENT — PAIN SCALES - GENERAL
PAINLEVEL_OUTOF10: 2
PAINLEVEL_OUTOF10: 8

## 2025-01-24 ASSESSMENT — PAIN - FUNCTIONAL ASSESSMENT
PAIN_FUNCTIONAL_ASSESSMENT: 0-10
PAIN_FUNCTIONAL_ASSESSMENT: 0-10

## 2025-01-24 ASSESSMENT — ENCOUNTER SYMPTOMS
OCCASIONAL FEELINGS OF UNSTEADINESS: 0
DEPRESSION: 0
LOSS OF SENSATION IN FEET: 0

## 2025-01-24 ASSESSMENT — COLUMBIA-SUICIDE SEVERITY RATING SCALE - C-SSRS
6. HAVE YOU EVER DONE ANYTHING, STARTED TO DO ANYTHING, OR PREPARED TO DO ANYTHING TO END YOUR LIFE?: NO
2. HAVE YOU ACTUALLY HAD ANY THOUGHTS OF KILLING YOURSELF?: NO

## 2025-01-28 ENCOUNTER — APPOINTMENT (OUTPATIENT)
Dept: PAIN MEDICINE | Facility: CLINIC | Age: 50
End: 2025-01-28
Payer: COMMERCIAL

## 2025-07-23 ENCOUNTER — PATIENT OUTREACH (OUTPATIENT)
Dept: CARE COORDINATION | Facility: CLINIC | Age: 50
End: 2025-07-23
Payer: COMMERCIAL

## 2025-07-23 DIAGNOSIS — Z12.31 ENCOUNTER FOR SCREENING MAMMOGRAM FOR BREAST CANCER: ICD-10-CM

## 2025-07-28 ENCOUNTER — OFFICE VISIT (OUTPATIENT)
Dept: PAIN MEDICINE | Facility: CLINIC | Age: 50
End: 2025-07-28
Payer: COMMERCIAL

## 2025-07-28 VITALS
HEIGHT: 69 IN | WEIGHT: 170 LBS | HEART RATE: 97 BPM | BODY MASS INDEX: 25.18 KG/M2 | RESPIRATION RATE: 16 BRPM | DIASTOLIC BLOOD PRESSURE: 76 MMHG | SYSTOLIC BLOOD PRESSURE: 118 MMHG

## 2025-07-28 DIAGNOSIS — G89.29 CHRONIC LEFT SHOULDER PAIN: ICD-10-CM

## 2025-07-28 DIAGNOSIS — M53.3 COCCYDYNIA: Primary | ICD-10-CM

## 2025-07-28 DIAGNOSIS — M25.512 CHRONIC LEFT SHOULDER PAIN: ICD-10-CM

## 2025-07-28 PROCEDURE — 99213 OFFICE O/P EST LOW 20 MIN: CPT

## 2025-07-28 RX ORDER — LIDOCAINE HYDROCHLORIDE 20 MG/ML
6 INJECTION, SOLUTION EPIDURAL; INFILTRATION; INTRACAUDAL; PERINEURAL ONCE
OUTPATIENT
Start: 2025-07-28 | End: 2025-07-28

## 2025-07-28 RX ORDER — METHYLPREDNISOLONE ACETATE 40 MG/ML
60 INJECTION, SUSPENSION INTRA-ARTICULAR; INTRALESIONAL; INTRAMUSCULAR; SOFT TISSUE ONCE
OUTPATIENT
Start: 2025-07-28 | End: 2025-07-28

## 2025-07-28 RX ORDER — BUPIVACAINE HYDROCHLORIDE 2.5 MG/ML
5 INJECTION, SOLUTION EPIDURAL; INFILTRATION; INTRACAUDAL; PERINEURAL ONCE
OUTPATIENT
Start: 2025-07-28 | End: 2025-07-28

## 2025-07-28 ASSESSMENT — ENCOUNTER SYMPTOMS
EYES NEGATIVE: 1
ADENOPATHY: 0
SHORTNESS OF BREATH: 0
DYSPHORIC MOOD: 0
CONSTITUTIONAL NEGATIVE: 1
COUGH: 0
WHEEZING: 0
ARTHRALGIAS: 0
BRUISES/BLEEDS EASILY: 0
MYALGIAS: 1
ALLERGIC/IMMUNOLOGIC NEGATIVE: 1
LIGHT-HEADEDNESS: 0
ENDOCRINE NEGATIVE: 1
FACIAL ASYMMETRY: 0
BACK PAIN: 1
WEAKNESS: 0
PALPITATIONS: 0

## 2025-07-28 ASSESSMENT — PAIN SCALES - GENERAL: PAINLEVEL_OUTOF10: 8

## 2025-07-28 NOTE — PROGRESS NOTES
Subjective   Patient ID: Savita Keys is a 50 y.o. female who presents for Shoulder Pain (Patient is in pain clinic today for chief complaint of left shoulder pain that has been ongoing.  She states she is overdue for an injection.  Patient rates her pain a 8/10 and describes it as achy and shooting sharp pain when she moves her arm.  ).  Patient also complains of tailbone pain.  She rates this pain a 7/10 and states when she stands it's a shocking pain.   Shoulder pain is increased with activities such as moving her arm.  Tailbone pain is increased with standing.  Patient has tried ibuprofen at home for her pain.  She also uses heat for her tailbone pain.    JOHN score 52.  Alcohol screen completed, negative.    Karina Haro RN 07/28/25 1:09 PM     The patient is a 50-year-old female presents today for a follow-up after undergoing bilateral coccygeal nerve block on 1/20/2025.  She reports significant relief from this procedure with 80% pain relief lasting for greater than 3 months.  On 1/8/2025 she had an in office shoulder injection that provided 80% relief also lasting greater than 3 months before the pain began to return.  Both injections improved her overall level of functioning and made it easier for her to perform ADLs.  Unfortunately she has missed a couple of follow-up since, and she says she is overdue for both injections.    Review of Systems   Constitutional: Negative.    HENT: Negative.     Eyes: Negative.    Respiratory:  Negative for cough, shortness of breath and wheezing.    Cardiovascular:  Negative for chest pain, palpitations and leg swelling.   Endocrine: Negative.    Genitourinary: Negative.    Musculoskeletal:  Positive for back pain and myalgias. Negative for arthralgias.   Skin: Negative.    Allergic/Immunologic: Negative.    Neurological:  Negative for facial asymmetry, weakness and light-headedness.   Hematological:  Negative for adenopathy. Does not bruise/bleed easily.    Psychiatric/Behavioral:  Negative for dysphoric mood and suicidal ideas.        Objective   Physical Exam  Constitutional:       General: She is not in acute distress.     Appearance: Normal appearance.   HENT:      Head: Normocephalic.      Mouth/Throat:      Mouth: Mucous membranes are moist.     Eyes:      Extraocular Movements: Extraocular movements intact.       Cardiovascular:      Rate and Rhythm: Normal rate and regular rhythm.      Pulses: Normal pulses.      Heart sounds: Normal heart sounds. No murmur heard.     No friction rub. No gallop.   Pulmonary:      Effort: Pulmonary effort is normal.      Breath sounds: Normal breath sounds. No wheezing, rhonchi or rales.   Abdominal:      General: Abdomen is flat.      Palpations: Abdomen is soft.     Musculoskeletal:      Cervical back: Normal range of motion.      Right lower leg: No edema.      Left lower leg: No edema.      Comments: Ambulates without assistance  Strength 5/5 BLE  Left arm ROM painful  Bilateral TTP of coccygeal region   Lymphadenopathy:      Cervical: No cervical adenopathy.     Skin:     General: Skin is warm and dry.     Neurological:      General: No focal deficit present.      Mental Status: She is alert and oriented to person, place, and time. Mental status is at baseline.     Psychiatric:         Mood and Affect: Mood normal.         Behavior: Behavior normal.         Assessment/Plan   Diagnoses and all orders for this visit:  Coccydynia  -     FL pain management; Future  -     Nerve Block; Future  Chronic left shoulder pain  Other orders  -     iohexol (OMNIPaque) 300 mg iodine/mL solution 3 mL  -     lidocaine PF (Xylocaine) 20 mg/mL (2 %) injection 120 mg  -     bupivacaine PF 0.25 % (Marcaine) 0.25 % (2.5 mg/mL) injection 12.5 mg  -     methylPREDNISolone acetate (DEPO-Medrol) injection 60 mg  -     NPO Diet Except: Sips with meds; Effective now; Standing  -     Height and weight; Standing  -     POCT pregnancy, urine;  Standing  -     Adult diet Regular; Standing  -     Vital Signs; Standing  -     Neuro checks; Standing  -     Notify provider (specify parameters); Standing  -     Prior to Discharge O2 Weaning; Standing  -     Pulse oximetry, continuous; Standing  -     Discharge patient; Standing       The patient is a 50-year-old female with a past medical history significant for the above-mentioned problems.  She currently rates her left shoulder pain an 8/10, saying she is overdue for an injection, the injections she was received in that shoulder in the past have been very helpful for her pain.  She rates her tailbone pain as 7/10 described as a shocking sort of pain.  She has had coccygeal nerve blocks numerous times in the past and has always found significant relief from them.  Her last 1 in January has since worn off, and she is interested in repeating it at this time.  Both areas of pain are interfering with her ability to function and perform ADLs.  We will proceed with a bilateral coccygeal nerve block under fluoroscopy.  The procedure was discussed, risks and benefits were discussed, patient is agreeable.  We will also pursue an in office left shoulder intra-articular steroid injection.  She will follow-up after the injections for reevaluation, call the clinic sooner if needed.

## 2025-07-29 ENCOUNTER — TELEPHONE (OUTPATIENT)
Dept: PAIN MEDICINE | Facility: CLINIC | Age: 50
End: 2025-07-29
Payer: COMMERCIAL

## 2025-08-12 ENCOUNTER — HOSPITAL ENCOUNTER (OUTPATIENT)
Dept: OPERATING ROOM | Facility: HOSPITAL | Age: 50
Setting detail: OUTPATIENT SURGERY
Discharge: HOME | End: 2025-08-12
Payer: COMMERCIAL

## 2025-08-12 VITALS
TEMPERATURE: 99.1 F | HEART RATE: 93 BPM | HEIGHT: 69 IN | DIASTOLIC BLOOD PRESSURE: 74 MMHG | WEIGHT: 168.8 LBS | OXYGEN SATURATION: 99 % | RESPIRATION RATE: 20 BRPM | BODY MASS INDEX: 25 KG/M2 | SYSTOLIC BLOOD PRESSURE: 119 MMHG

## 2025-08-12 DIAGNOSIS — M53.3 COCCYDYNIA: ICD-10-CM

## 2025-08-12 PROCEDURE — 7100000009 HC PHASE TWO TIME - INITIAL BASE CHARGE

## 2025-08-12 PROCEDURE — 2550000001 HC RX 255 CONTRASTS: Performed by: STUDENT IN AN ORGANIZED HEALTH CARE EDUCATION/TRAINING PROGRAM

## 2025-08-12 PROCEDURE — 7100000010 HC PHASE TWO TIME - EACH INCREMENTAL 1 MINUTE

## 2025-08-12 PROCEDURE — 77002 NEEDLE LOCALIZATION BY XRAY: CPT | Performed by: STUDENT IN AN ORGANIZED HEALTH CARE EDUCATION/TRAINING PROGRAM

## 2025-08-12 PROCEDURE — 2500000004 HC RX 250 GENERAL PHARMACY W/ HCPCS (ALT 636 FOR OP/ED): Performed by: STUDENT IN AN ORGANIZED HEALTH CARE EDUCATION/TRAINING PROGRAM

## 2025-08-12 RX ORDER — BUPIVACAINE HYDROCHLORIDE 2.5 MG/ML
INJECTION, SOLUTION EPIDURAL; INFILTRATION; INTRACAUDAL; PERINEURAL AS NEEDED
Status: COMPLETED | OUTPATIENT
Start: 2025-08-12 | End: 2025-08-12

## 2025-08-12 RX ORDER — LIDOCAINE HYDROCHLORIDE 20 MG/ML
INJECTION, SOLUTION EPIDURAL; INFILTRATION; INTRACAUDAL; PERINEURAL AS NEEDED
Status: COMPLETED | OUTPATIENT
Start: 2025-08-12 | End: 2025-08-12

## 2025-08-12 RX ORDER — METHYLPREDNISOLONE ACETATE 40 MG/ML
INJECTION, SUSPENSION INTRA-ARTICULAR; INTRALESIONAL; INTRAMUSCULAR; SOFT TISSUE AS NEEDED
Status: COMPLETED | OUTPATIENT
Start: 2025-08-12 | End: 2025-08-12

## 2025-08-12 RX ADMIN — IOHEXOL 1 ML: 300 INJECTION, SOLUTION INTRAVENOUS at 10:09

## 2025-08-12 RX ADMIN — BUPIVACAINE HYDROCHLORIDE 2 ML: 2.5 INJECTION, SOLUTION EPIDURAL; INFILTRATION; INTRACAUDAL; PERINEURAL at 10:08

## 2025-08-12 RX ADMIN — METHYLPREDNISOLONE ACETATE 40 MG: 40 INJECTION, SUSPENSION INTRA-ARTICULAR; INTRALESIONAL; INTRAMUSCULAR; SOFT TISSUE at 10:08

## 2025-08-12 RX ADMIN — LIDOCAINE HYDROCHLORIDE 3 ML: 20 INJECTION, SOLUTION EPIDURAL; INFILTRATION; INTRACAUDAL; PERINEURAL at 10:08

## 2025-08-12 ASSESSMENT — COLUMBIA-SUICIDE SEVERITY RATING SCALE - C-SSRS
1. IN THE PAST MONTH, HAVE YOU WISHED YOU WERE DEAD OR WISHED YOU COULD GO TO SLEEP AND NOT WAKE UP?: NO
6. HAVE YOU EVER DONE ANYTHING, STARTED TO DO ANYTHING, OR PREPARED TO DO ANYTHING TO END YOUR LIFE?: NO
2. HAVE YOU ACTUALLY HAD ANY THOUGHTS OF KILLING YOURSELF?: NO

## 2025-08-12 ASSESSMENT — PAIN - FUNCTIONAL ASSESSMENT
PAIN_FUNCTIONAL_ASSESSMENT: 0-10
PAIN_FUNCTIONAL_ASSESSMENT: 0-10

## 2025-08-12 ASSESSMENT — PAIN DESCRIPTION - DESCRIPTORS: DESCRIPTORS: ACHING

## 2025-08-12 ASSESSMENT — PAIN SCALES - GENERAL
PAINLEVEL_OUTOF10: 3
PAINLEVEL_OUTOF10: 5 - MODERATE PAIN

## 2025-08-20 ENCOUNTER — OFFICE VISIT (OUTPATIENT)
Dept: PAIN MEDICINE | Facility: CLINIC | Age: 50
End: 2025-08-20
Payer: COMMERCIAL

## 2025-08-20 VITALS — DIASTOLIC BLOOD PRESSURE: 69 MMHG | RESPIRATION RATE: 16 BRPM | SYSTOLIC BLOOD PRESSURE: 104 MMHG | HEART RATE: 105 BPM

## 2025-08-20 DIAGNOSIS — M25.512 CHRONIC LEFT SHOULDER PAIN: ICD-10-CM

## 2025-08-20 DIAGNOSIS — G89.29 CHRONIC LEFT SHOULDER PAIN: ICD-10-CM

## 2025-08-20 DIAGNOSIS — M54.12 CERVICAL RADICULITIS: ICD-10-CM

## 2025-08-20 DIAGNOSIS — M25.511 CHRONIC RIGHT SHOULDER PAIN: ICD-10-CM

## 2025-08-20 DIAGNOSIS — M53.3 COCCYDYNIA: Primary | ICD-10-CM

## 2025-08-20 DIAGNOSIS — G89.29 CHRONIC RIGHT SHOULDER PAIN: ICD-10-CM

## 2025-08-20 PROCEDURE — 99213 OFFICE O/P EST LOW 20 MIN: CPT | Mod: 25

## 2025-08-20 PROCEDURE — 2500000004 HC RX 250 GENERAL PHARMACY W/ HCPCS (ALT 636 FOR OP/ED): Performed by: STUDENT IN AN ORGANIZED HEALTH CARE EDUCATION/TRAINING PROGRAM

## 2025-08-20 PROCEDURE — 20610 DRAIN/INJ JOINT/BURSA W/O US: CPT | Mod: LT | Performed by: STUDENT IN AN ORGANIZED HEALTH CARE EDUCATION/TRAINING PROGRAM

## 2025-08-20 RX ORDER — BUPIVACAINE HYDROCHLORIDE 2.5 MG/ML
3 INJECTION, SOLUTION EPIDURAL; INFILTRATION; INTRACAUDAL; PERINEURAL ONCE
Status: COMPLETED | OUTPATIENT
Start: 2025-08-20 | End: 2025-08-20

## 2025-08-20 RX ORDER — TRIAMCINOLONE ACETONIDE 40 MG/ML
40 INJECTION, SUSPENSION INTRA-ARTICULAR; INTRAMUSCULAR ONCE
Status: COMPLETED | OUTPATIENT
Start: 2025-08-20 | End: 2025-08-20

## 2025-08-20 RX ADMIN — TRIAMCINOLONE ACETONIDE 40 MG: 40 INJECTION, SUSPENSION INTRA-ARTICULAR; INTRAMUSCULAR at 09:11

## 2025-08-20 RX ADMIN — BUPIVACAINE HYDROCHLORIDE 7.5 MG: 2.5 INJECTION, SOLUTION EPIDURAL; INFILTRATION; INTRACAUDAL; PERINEURAL at 09:11

## 2025-09-17 ENCOUNTER — APPOINTMENT (OUTPATIENT)
Dept: PAIN MEDICINE | Facility: CLINIC | Age: 50
End: 2025-09-17
Payer: COMMERCIAL